# Patient Record
Sex: FEMALE | Race: BLACK OR AFRICAN AMERICAN | NOT HISPANIC OR LATINO | Employment: OTHER | URBAN - METROPOLITAN AREA
[De-identification: names, ages, dates, MRNs, and addresses within clinical notes are randomized per-mention and may not be internally consistent; named-entity substitution may affect disease eponyms.]

---

## 2020-08-07 ENCOUNTER — HOSPITAL ENCOUNTER (OUTPATIENT)
Facility: HOSPITAL | Age: 70
Discharge: HOME | End: 2020-08-08
Attending: EMERGENCY MEDICINE | Admitting: INTERNAL MEDICINE
Payer: MEDICARE

## 2020-08-07 DIAGNOSIS — Z45.018 ENCOUNTER FOR INTERROGATION OF CARDIAC PACEMAKER: ICD-10-CM

## 2020-08-07 DIAGNOSIS — R07.9 CHEST PAIN: ICD-10-CM

## 2020-08-07 DIAGNOSIS — R00.2 PALPITATIONS: Primary | ICD-10-CM

## 2020-08-07 DIAGNOSIS — R00.2 PALPITATIONS: ICD-10-CM

## 2020-08-07 DIAGNOSIS — E87.6 HYPOKALEMIA: ICD-10-CM

## 2020-08-07 LAB
ALBUMIN SERPL BCP-MCNC: 4.3 G/DL (ref 3.5–5.2)
ALP SERPL-CCNC: 67 U/L (ref 55–135)
ALT SERPL W/O P-5'-P-CCNC: 27 U/L (ref 10–44)
ANION GAP SERPL CALC-SCNC: 10 MMOL/L (ref 8–16)
AST SERPL-CCNC: 30 U/L (ref 10–40)
BASOPHILS # BLD AUTO: 0.03 K/UL (ref 0–0.2)
BASOPHILS NFR BLD: 0.7 % (ref 0–1.9)
BILIRUB SERPL-MCNC: 0.5 MG/DL (ref 0.1–1)
BNP SERPL-MCNC: 27 PG/ML (ref 0–99)
BUN SERPL-MCNC: 17 MG/DL (ref 8–23)
CALCIUM SERPL-MCNC: 9.3 MG/DL (ref 8.7–10.5)
CHLORIDE SERPL-SCNC: 106 MMOL/L (ref 95–110)
CO2 SERPL-SCNC: 23 MMOL/L (ref 23–29)
CREAT SERPL-MCNC: 1 MG/DL (ref 0.5–1.4)
D DIMER PPP IA.FEU-MCNC: 0.68 MG/L FEU
DIFFERENTIAL METHOD: ABNORMAL
EOSINOPHIL # BLD AUTO: 0.1 K/UL (ref 0–0.5)
EOSINOPHIL NFR BLD: 1.2 % (ref 0–8)
ERYTHROCYTE [DISTWIDTH] IN BLOOD BY AUTOMATED COUNT: 14 % (ref 11.5–14.5)
EST. GFR  (AFRICAN AMERICAN): >60 ML/MIN/1.73 M^2
EST. GFR  (NON AFRICAN AMERICAN): 58 ML/MIN/1.73 M^2
GLUCOSE SERPL-MCNC: 165 MG/DL (ref 70–110)
HCT VFR BLD AUTO: 34.7 % (ref 37–48.5)
HGB BLD-MCNC: 11.3 G/DL (ref 12–16)
IMM GRANULOCYTES # BLD AUTO: 0.01 K/UL (ref 0–0.04)
IMM GRANULOCYTES NFR BLD AUTO: 0.2 % (ref 0–0.5)
LYMPHOCYTES # BLD AUTO: 1.7 K/UL (ref 1–4.8)
LYMPHOCYTES NFR BLD: 41.9 % (ref 18–48)
MAGNESIUM SERPL-MCNC: 1.9 MG/DL (ref 1.6–2.6)
MCH RBC QN AUTO: 28.3 PG (ref 27–31)
MCHC RBC AUTO-ENTMCNC: 32.6 G/DL (ref 32–36)
MCV RBC AUTO: 87 FL (ref 82–98)
MONOCYTES # BLD AUTO: 0.3 K/UL (ref 0.3–1)
MONOCYTES NFR BLD: 7.2 % (ref 4–15)
NEUTROPHILS # BLD AUTO: 2 K/UL (ref 1.8–7.7)
NEUTROPHILS NFR BLD: 48.8 % (ref 38–73)
NRBC BLD-RTO: 0 /100 WBC
PLATELET # BLD AUTO: 235 K/UL (ref 150–350)
PMV BLD AUTO: 9.8 FL (ref 9.2–12.9)
POTASSIUM SERPL-SCNC: 3.2 MMOL/L (ref 3.5–5.1)
PROT SERPL-MCNC: 7.4 G/DL (ref 6–8.4)
RBC # BLD AUTO: 4 M/UL (ref 4–5.4)
SARS-COV-2 RDRP RESP QL NAA+PROBE: NEGATIVE
SODIUM SERPL-SCNC: 139 MMOL/L (ref 136–145)
TROPONIN I SERPL DL<=0.01 NG/ML-MCNC: 0.01 NG/ML (ref 0.02–0.5)
TROPONIN I SERPL DL<=0.01 NG/ML-MCNC: <0.01 NG/ML (ref 0.02–0.5)
WBC # BLD AUTO: 4.03 K/UL (ref 3.9–12.7)

## 2020-08-07 PROCEDURE — 83735 ASSAY OF MAGNESIUM: CPT

## 2020-08-07 PROCEDURE — 25000003 PHARM REV CODE 250: Performed by: EMERGENCY MEDICINE

## 2020-08-07 PROCEDURE — 84484 ASSAY OF TROPONIN QUANT: CPT

## 2020-08-07 PROCEDURE — 96360 HYDRATION IV INFUSION INIT: CPT | Performed by: INTERNAL MEDICINE

## 2020-08-07 PROCEDURE — 96361 HYDRATE IV INFUSION ADD-ON: CPT | Performed by: INTERNAL MEDICINE

## 2020-08-07 PROCEDURE — 85025 COMPLETE CBC W/AUTO DIFF WBC: CPT

## 2020-08-07 PROCEDURE — 80053 COMPREHEN METABOLIC PANEL: CPT

## 2020-08-07 PROCEDURE — 83880 ASSAY OF NATRIURETIC PEPTIDE: CPT

## 2020-08-07 PROCEDURE — G0378 HOSPITAL OBSERVATION PER HR: HCPCS

## 2020-08-07 PROCEDURE — 71045 X-RAY EXAM CHEST 1 VIEW: CPT | Mod: TC,FY

## 2020-08-07 PROCEDURE — 93005 ELECTROCARDIOGRAM TRACING: CPT

## 2020-08-07 PROCEDURE — 36415 COLL VENOUS BLD VENIPUNCTURE: CPT

## 2020-08-07 PROCEDURE — 71045 XR CHEST AP PORTABLE: ICD-10-PCS | Mod: 26,,, | Performed by: RADIOLOGY

## 2020-08-07 PROCEDURE — 84484 ASSAY OF TROPONIN QUANT: CPT | Mod: 91

## 2020-08-07 PROCEDURE — 99285 EMERGENCY DEPT VISIT HI MDM: CPT | Mod: 25

## 2020-08-07 PROCEDURE — 71045 X-RAY EXAM CHEST 1 VIEW: CPT | Mod: 26,,, | Performed by: RADIOLOGY

## 2020-08-07 PROCEDURE — 99220 PR INITIAL OBSERVATION CARE,LEVL III: ICD-10-PCS | Mod: ,,, | Performed by: INTERNAL MEDICINE

## 2020-08-07 PROCEDURE — 25000003 PHARM REV CODE 250: Performed by: INTERNAL MEDICINE

## 2020-08-07 PROCEDURE — 99220 PR INITIAL OBSERVATION CARE,LEVL III: CPT | Mod: ,,, | Performed by: INTERNAL MEDICINE

## 2020-08-07 PROCEDURE — U0002 COVID-19 LAB TEST NON-CDC: HCPCS

## 2020-08-07 PROCEDURE — 94760 N-INVAS EAR/PLS OXIMETRY 1: CPT

## 2020-08-07 PROCEDURE — 85379 FIBRIN DEGRADATION QUANT: CPT

## 2020-08-07 RX ORDER — PSEUDOEPHEDRINE HCL 30 MG
30 TABLET ORAL EVERY 4 HOURS PRN
COMMUNITY

## 2020-08-07 RX ORDER — SODIUM CHLORIDE 9 MG/ML
INJECTION, SOLUTION INTRAVENOUS CONTINUOUS
Status: DISCONTINUED | OUTPATIENT
Start: 2020-08-07 | End: 2020-08-08 | Stop reason: HOSPADM

## 2020-08-07 RX ORDER — ASPIRIN 325 MG
325 TABLET ORAL
Status: COMPLETED | OUTPATIENT
Start: 2020-08-07 | End: 2020-08-07

## 2020-08-07 RX ORDER — SODIUM CHLORIDE 0.9 % (FLUSH) 0.9 %
10 SYRINGE (ML) INJECTION
Status: DISCONTINUED | OUTPATIENT
Start: 2020-08-07 | End: 2020-08-08 | Stop reason: HOSPADM

## 2020-08-07 RX ORDER — ATORVASTATIN CALCIUM 10 MG/1
10 TABLET, FILM COATED ORAL DAILY
COMMUNITY

## 2020-08-07 RX ORDER — NAPROXEN SODIUM 220 MG/1
81 TABLET, FILM COATED ORAL DAILY
COMMUNITY

## 2020-08-07 RX ORDER — ACETAMINOPHEN 325 MG/1
650 TABLET ORAL EVERY 4 HOURS PRN
Status: DISCONTINUED | OUTPATIENT
Start: 2020-08-07 | End: 2020-08-08 | Stop reason: HOSPADM

## 2020-08-07 RX ORDER — AMLODIPINE BESYLATE 2.5 MG/1
10 TABLET ORAL DAILY
Status: DISCONTINUED | OUTPATIENT
Start: 2020-08-07 | End: 2020-08-08 | Stop reason: HOSPADM

## 2020-08-07 RX ORDER — MORPHINE SULFATE 10 MG/ML
2 INJECTION INTRAMUSCULAR; INTRAVENOUS; SUBCUTANEOUS EVERY 4 HOURS PRN
Status: DISCONTINUED | OUTPATIENT
Start: 2020-08-07 | End: 2020-08-08 | Stop reason: HOSPADM

## 2020-08-07 RX ORDER — NAPROXEN SODIUM 220 MG/1
81 TABLET, FILM COATED ORAL DAILY
Status: DISCONTINUED | OUTPATIENT
Start: 2020-08-07 | End: 2020-08-08 | Stop reason: HOSPADM

## 2020-08-07 RX ORDER — METOPROLOL TARTRATE 25 MG/1
100 TABLET, FILM COATED ORAL 2 TIMES DAILY
Status: DISCONTINUED | OUTPATIENT
Start: 2020-08-07 | End: 2020-08-08 | Stop reason: HOSPADM

## 2020-08-07 RX ORDER — LISINOPRIL 10 MG/1
20 TABLET ORAL DAILY
Status: DISCONTINUED | OUTPATIENT
Start: 2020-08-07 | End: 2020-08-08 | Stop reason: HOSPADM

## 2020-08-07 RX ORDER — AMLODIPINE BESYLATE 10 MG/1
10 TABLET ORAL DAILY
COMMUNITY

## 2020-08-07 RX ORDER — METOPROLOL TARTRATE 100 MG/1
100 TABLET ORAL 2 TIMES DAILY
COMMUNITY

## 2020-08-07 RX ORDER — SODIUM CHLORIDE 9 MG/ML
INJECTION, SOLUTION INTRAVENOUS CONTINUOUS
Status: DISCONTINUED | OUTPATIENT
Start: 2020-08-07 | End: 2020-08-07

## 2020-08-07 RX ORDER — LISINOPRIL 20 MG/1
20 TABLET ORAL DAILY
COMMUNITY

## 2020-08-07 RX ADMIN — SODIUM CHLORIDE: 0.9 INJECTION, SOLUTION INTRAVENOUS at 11:08

## 2020-08-07 RX ADMIN — ASPIRIN 325 MG ORAL TABLET 325 MG: 325 PILL ORAL at 09:08

## 2020-08-07 RX ADMIN — METOPROLOL TARTRATE 100 MG: 25 TABLET, FILM COATED ORAL at 09:08

## 2020-08-07 RX ADMIN — SODIUM CHLORIDE: 0.9 INJECTION, SOLUTION INTRAVENOUS at 03:08

## 2020-08-07 NOTE — SUBJECTIVE & OBJECTIVE
Past Medical History:   Diagnosis Date    Hyperlipemia     Hypertension        Past Surgical History:   Procedure Laterality Date     SECTION      INSERTION OF PACEMAKER         Review of patient's allergies indicates:  No Known Allergies    No current facility-administered medications on file prior to encounter.      Current Outpatient Medications on File Prior to Encounter   Medication Sig    amLODIPine (NORVASC) 10 MG tablet Take 10 mg by mouth once daily.    aspirin 81 MG Chew Take 81 mg by mouth once daily.    atorvastatin (LIPITOR) 10 MG tablet Take 10 mg by mouth once daily.    lisinopriL (PRINIVIL,ZESTRIL) 20 MG tablet Take 20 mg by mouth once daily.    metoprolol tartrate (LOPRESSOR) 100 MG tablet Take 100 mg by mouth 2 (two) times daily.    pseudoephedrine (SUDAFED) 30 MG tablet Take 30 mg by mouth every 4 (four) hours as needed for Congestion.     Family History     None        Tobacco Use    Smoking status: Never Smoker   Substance and Sexual Activity    Alcohol use: Not Currently    Drug use: Not on file    Sexual activity: Not on file     Review of Systems   Constitutional: Negative for activity change, appetite change, fatigue and fever.   HENT: Negative for congestion, ear discharge, mouth sores, nosebleeds, rhinorrhea, sinus pressure, sinus pain and tinnitus.    Eyes: Negative.  Negative for pain, redness and itching.   Respiratory: Negative for apnea, cough, choking, chest tightness, shortness of breath, wheezing and stridor.    Cardiovascular: Negative for chest pain, palpitations and leg swelling.   Gastrointestinal: Negative for abdominal distention, abdominal pain, anal bleeding, blood in stool, constipation and diarrhea.   Endocrine: Negative.    Genitourinary: Negative for difficulty urinating, flank pain, frequency and urgency.   Musculoskeletal: Negative for arthralgias, back pain, gait problem and myalgias.   Skin: Negative for color change and pallor.    Allergic/Immunologic: Negative.    Neurological: Negative for dizziness, facial asymmetry, weakness, light-headedness and headaches.   Hematological: Negative for adenopathy. Does not bruise/bleed easily.   Psychiatric/Behavioral: The patient is nervous/anxious.      Objective:     Vital Signs (Most Recent):  Temp: 97.2 °F (36.2 °C) (08/07/20 1125)  Pulse: 70 (08/07/20 1125)  Resp: 18 (08/07/20 1125)  BP: 127/64 (08/07/20 1125)  SpO2: 98 % (08/07/20 1125) Vital Signs (24h Range):  Temp:  [97.2 °F (36.2 °C)-98 °F (36.7 °C)] 97.2 °F (36.2 °C)  Pulse:  [] 70  Resp:  [14-20] 18  SpO2:  [96 %-98 %] 98 %  BP: (115-138)/(64-72) 127/64     Weight: 74.3 kg (163 lb 12.8 oz)  Body mass index is 27.26 kg/m².    Physical Exam  Vitals signs and nursing note reviewed.   Constitutional:       Appearance: She is well-developed.   HENT:      Head: Normocephalic and atraumatic.   Eyes:      Pupils: Pupils are equal, round, and reactive to light.   Neck:      Musculoskeletal: Normal range of motion and neck supple.      Thyroid: No thyromegaly.      Trachea: No tracheal deviation.   Cardiovascular:      Rate and Rhythm: Normal rate and regular rhythm.      Heart sounds: Normal heart sounds.   Pulmonary:      Effort: Pulmonary effort is normal.      Breath sounds: Normal breath sounds.   Abdominal:      General: Bowel sounds are normal. There is no distension.      Palpations: Abdomen is soft.      Tenderness: There is no abdominal tenderness. There is no guarding or rebound.   Musculoskeletal: Normal range of motion.   Lymphadenopathy:      Cervical: No cervical adenopathy.   Skin:     General: Skin is warm and dry.      Capillary Refill: Capillary refill takes less than 2 seconds.   Neurological:      Mental Status: She is alert and oriented to person, place, and time.   Psychiatric:         Behavior: Behavior normal.         Thought Content: Thought content normal.         Judgment: Judgment normal.           CRANIAL NERVES      CN III, IV, VI   Pupils are equal, round, and reactive to light.       Significant Labs:   Recent Lab Results       08/07/20  0921   08/07/20  0904        Albumin   4.3     Alkaline Phosphatase   67     ALT   27     Anion Gap   10     AST   30     Baso #   0.03     Basophil%   0.7     BILIRUBIN TOTAL   0.5  Comment:  For infants and newborns, interpretation of results should be based  on gestational age, weight and in agreement with clinical  observations.  Premature Infant recommended reference ranges:  Up to 24 hours.............<8.0 mg/dL  Up to 48 hours............<12.0 mg/dL  3-5 days..................<15.0 mg/dL  6-29 days.................<15.0 mg/dL       BNP   27  Comment:  Values of less than 100 pg/ml are consistent with non-CHF populations.     BUN, Bld   17     Calcium   9.3     Chloride   106     CO2   23     Creatinine   1.0     D-Dimer   0.68  Comment:  The quantitative D-dimer assay should be used as an aid in   the diagnosis of deep vein thrombosis and pulmonary embolism  in patients with the appropriate presentation and clinical  history. The upper limit of the reference interval and the clinical   cut off   point are identical. Causes of a positive (>0.50 mg/L FEU) D-Dimer   test  include, but are not limited to: DVT, PE, DIC, thrombolytic   therapy, anticoagulant therapy, recent surgery, trauma, or   pregnancy, disseminated malignancy, aortic aneurysm, cirrhosis,  and severe infection. False negative results may occur in   patients with distal DVT.       Differential Method   Automated     eGFR if    >60.0     eGFR if non    58.0  Comment:  Calculation used to obtain the estimated glomerular filtration  rate (eGFR) is the CKD-EPI equation.        Eos #   0.1     Eosinophil%   1.2     Glucose   165     Gran # (ANC)   2.0     Gran%   48.8     Hematocrit   34.7     Hemoglobin   11.3     Immature Grans (Abs)   0.01  Comment:  Mild elevation in immature granulocytes  is non specific and   can be seen in a variety of conditions including stress response,   acute inflammation, trauma and pregnancy. Correlation with other   laboratory and clinical findings is essential.       Immature Granulocytes   0.2     Lymph #   1.7     Lymph%   41.9     Magnesium   1.9     MCH   28.3     MCHC   32.6     MCV   87     Mono #   0.3     Mono%   7.2     MPV   9.8     nRBC   0     Platelets   235     Potassium   3.2     PROTEIN TOTAL   7.4     RBC   4.00     RDW   14.0     SARS-CoV-2 RNA, Amplification, Qual Negative  Comment:  This test utilizes isothermal nucleic acid amplification   technology to detect the SARS-CoV-2 RdRp nucleic acid segment.   The analytical sensitivity (limit of detection) is 125 genome   equivalents/mL.   A POSITIVE result implies infection with the SARS-CoV-2 virus;  the patient is presumed to be contagious.    A NEGATIVE result means that SARS-CoV-2 nucleic acids are not  present above the limit of detection. A NEGATIVE result should be   treated as presumptive. It does not rule out the possibility of   COVID-19 and should not be the sole basis for treatment decisions.   If COVID-19 is strongly suspected based on clinical and exposure   history, re-testing using an alternate molecular assay should be   considered.   This test is only for use under the Food and Drug   Administration s Emergency Use Authorization (EUA).   Commercial kits are provided by [employer].   Performance characteristics of the EUA have been independently  verified by [facility] Department of  Pathology and Laboratory Medicine.   _________________________________________________________________  The ID NOW COVID-19 Letter of Authorization, along with the   authorized Fact Sheet for Healthcare Providers, the authorized Fact  Sheet for Patients, and authorized labeling are available on the FDA   website:  www.fda.gov/MedicalDevices/Safety/EmergencySituations/rnu496293.htm          Sodium   139     Troponin I   <0.01     WBC   4.03         All pertinent labs within the past 24 hours have been reviewed.    Significant Imaging: I have reviewed and interpreted all pertinent imaging results/findings within the past 24 hours.

## 2020-08-07 NOTE — HPI
Patient is a 69-year-old female that presented to the emergency department this morning complaining of feeling that her heart was speeding up in slowing down.  Patient also complains of palpitations with mild shortness of breath.  Patient explained this by saying when these occurred she felt like she had to take a deep breath.  She denied this was associated with any chest pain, diaphoresis, nausea, vomiting, but was associated with feeling of mild shortness of breath and palpitations.  Patient has a past medical history of pacemaker placement due to a third-degree heart block.  Patient does seem be functioning correctly by a CD however patient a representative will be out today to interrogate her pacer.  At the time of my visit after admission the patient stated that the feeling in her chest had disappeared and he was eating and tolerating her lunch.  Otherwise this patient has a history of hypertension.

## 2020-08-07 NOTE — ED PROVIDER NOTES
Encounter Date: 2020       History     Chief Complaint   Patient presents with    Palpitations     this am     63-year-old female past medical history significant for hyperlipidemia, hypertension, pacemaker in situ presents to the ED for emergent evaluation chest pain with associated palpitations that began approximately 30 min prior to arrival while lying in bed watching television.  Denies any previous illness, fever, chills, cough.  Follows with a cardiologist in [place], [place] and had a normal checkup approximately 1 month ago.       Arrives to the ED tachycardic with acute chest pain.        Review of patient's allergies indicates:  No Known Allergies  Past Medical History:   Diagnosis Date    Hyperlipemia     Hypertension      Past Surgical History:   Procedure Laterality Date     SECTION      INSERTION OF PACEMAKER       History reviewed. No pertinent family history.  Social History     Tobacco Use    Smoking status: Never Smoker   Substance Use Topics    Alcohol use: Not Currently    Drug use: Not on file     Review of Systems   Constitutional: Negative for appetite change, chills, diaphoresis, fatigue and fever.   HENT: Negative for congestion, ear pain, rhinorrhea, sinus pressure, sinus pain, sore throat and tinnitus.    Eyes: Negative for photophobia and visual disturbance.   Respiratory: Positive for chest tightness and shortness of breath. Negative for cough and wheezing.    Cardiovascular: Positive for chest pain. Negative for palpitations and leg swelling.   Gastrointestinal: Negative for abdominal pain, constipation, diarrhea, nausea and vomiting.   Endocrine: Negative for cold intolerance, heat intolerance, polydipsia, polyphagia and polyuria.   Genitourinary: Negative for decreased urine volume, difficulty urinating, dysuria, flank pain, frequency, hematuria and urgency.   Musculoskeletal: Negative for arthralgias, back pain, gait problem, joint swelling, myalgias, neck  pain and neck stiffness.   Skin: Negative for color change, pallor, rash and wound.   Allergic/Immunologic: Negative for immunocompromised state.   Neurological: Negative for dizziness, syncope, weakness, light-headedness, numbness and headaches.   Hematological: Negative for adenopathy. Does not bruise/bleed easily.   Psychiatric/Behavioral: Negative for decreased concentration, dysphoric mood and sleep disturbance. The patient is not nervous/anxious.    All other systems reviewed and are negative.      Physical Exam     Initial Vitals [08/07/20 0837]   BP Pulse Resp Temp SpO2   115/71 (!) 120 20 98 °F (36.7 °C) 97 %      MAP       --         Physical Exam    Nursing note and vitals reviewed.  Constitutional: She appears well-developed and well-nourished. She is not diaphoretic. No distress.   HENT:   Head: Normocephalic and atraumatic.   Right Ear: External ear normal.   Left Ear: External ear normal.   Nose: Nose normal.   Mouth/Throat: Oropharynx is clear and moist.   Eyes: Conjunctivae are normal. Pupils are equal, round, and reactive to light. No scleral icterus.   Neck: Normal range of motion. Neck supple. No JVD present.   Cardiovascular: Regular rhythm, normal heart sounds and intact distal pulses. Tachycardia present.    Pulmonary/Chest: Breath sounds normal. No respiratory distress. She has no wheezes. She has no rhonchi. She has no rales. She exhibits no tenderness.   Abdominal: Soft. Bowel sounds are normal. She exhibits no distension. There is no abdominal tenderness. There is no rebound and no guarding.   Musculoskeletal: Normal range of motion. No tenderness or edema.   Lymphadenopathy:     She has no cervical adenopathy.   Neurological: She is alert and oriented to person, place, and time. GCS score is 15. GCS eye subscore is 4. GCS verbal subscore is 5. GCS motor subscore is 6.   Skin: Skin is warm and dry. Capillary refill takes less than 2 seconds. No rash noted. No erythema. No pallor.    Psychiatric: She has a normal mood and affect. Her behavior is normal. Judgment and thought content normal.         ED Course   Procedures  Labs Reviewed   CBC W/ AUTO DIFFERENTIAL - Abnormal; Notable for the following components:       Result Value    Hemoglobin 11.3 (*)     Hematocrit 34.7 (*)     All other components within normal limits   COMPREHENSIVE METABOLIC PANEL - Abnormal; Notable for the following components:    Potassium 3.2 (*)     Glucose 165 (*)     eGFR if non  58.0 (*)     All other components within normal limits   TROPONIN I - Abnormal; Notable for the following components:    Troponin I <0.01 (*)     All other components within normal limits   D DIMER, QUANTITATIVE - Abnormal; Notable for the following components:    D-Dimer 0.68 (*)     All other components within normal limits   B-TYPE NATRIURETIC PEPTIDE   MAGNESIUM   SARS-COV-2 RNA AMPLIFICATION, QUAL     EKG Readings: (Independently Interpreted)   Initial Reading: No STEMI. Rhythm: Paced Rhythm. Heart Rate: 88. Ectopy: No Ectopy. Conduction: Normal. ST Segments: Normal ST Segments. T Waves: Normal. Axis: Normal. Clinical Impression: Paced Rhythm       Imaging Results          X-Ray Chest AP Portable (Final result)  Result time 08/07/20 09:26:25    Final result by Mary Walker MD (08/07/20 09:26:25)                 Impression:      Borderline heart size.  Findings suggestive of minimal interstitial edema.      Electronically signed by: Mary Walker  Date:    08/07/2020  Time:    09:26             Narrative:    EXAMINATION:  XR CHEST AP PORTABLE    CLINICAL HISTORY:  Chest Pain;    TECHNIQUE:  Single frontal view of the chest was performed.    COMPARISON:  None    FINDINGS:  The heart size is at the upper limits of normal with a dual lead pacemaker in place.  Indistinctness of the pulmonary vasculature raises the possibility of a minimal interstitial edema.  A trace left pleural effusion is not ruled out.   There is widening of the right AC joint which may be due to an old injury.  Calcific plaque is noted in the aorta.                              X-Rays:   Independently Interpreted Readings:   Chest X-Ray: No acute disease seen, no consolidation, atelectasis or PTX.     Medical Decision Making:   Differential Diagnosis:   Acute chest wall pain, acute costochondritis, acute myocardial infarction, stable angina pectoris, unstable angina pectoris, acute aortic dissection, atypical chest pain, acute cholecystitis, acute chest pain, acute cholelithiasis, acute musculoskeletal pain, acute pericarditis, acute myocarditis, acute coronary vasospasm, acute dissecting aorta, acute/episodic esophageal spasm, GERD, acute pleurisy, pneumonia, spontaneous pneumothorax, tension pneumothorax, hiatal hernia, acute myocardial ischemia, acute pancreatitis, acute peptic ulcer, acute ST elevation MI, acute congestive heart failure  ED Management:  This is an emergent evaluation for a patient with chest pain. The patient is complaining of chest pain for the past day. The patient's pain is atypical for cardiac etiology.  I decided to obtain and review the patient's past medical record.      The vital signs are stable in the room but do reflect tachycardia.  EKG is normal.  There is no evidence of STEMI or ischemia.    CXR is negative for pneumonia, pneumothorax and edema.  Troponin is negative and but due to abrupt onset and history of pacemaker will require observation for ACS rule out.  [employer] representative has been contacted for interrogation - pending arrival.  BNP is negative.  There is no evidence of congestive heart failure.  The electrolytes are relatively normal.  The pt is not anemic.  D-DIMER is slightly elevated.  This has been relayed to the admitting hospitalist who will follow with a CT PE study.  The patient's symptoms were treated with:  Aspirin    Currently the patient has a non-diagnostic EKG with negative troponin in  the emergency department.  I doubt acute coronary syndrome.  I did inform the patient that even with negative testing, we can never eliminate all risk.  I believe the patient is low risk with negative initial testing; they are appropriate for observation with serial cardiac enzymes and pacemaker interrogation, possible stress test.  The patient is aware of the small but persistent risk for MI/ACS with subsequent cardiac complications or death.  I have low suspicion for cardiopulmonary, vascular, infectious, respiratory, or other emergent medical condition based on my evaluation in the ED.     The patient's pain is currently improved.      The results and physical exam findings were reviewed with the patient. Patient agrees with assessment, disposition and treatment plan and has no further questions or complaints at this time.  She will be admitted to Dr. Ribera at this time    Additional MDM:   PERC Rule:   Age is greater than or equal to 50 = 1.0  Heart Rate is greater than or equal to 100 = 1.0  SaO2 on room air < 95% = 0.0  Unilateral leg swelling = 0.0  Hemoptysis = 0.0  Recent surgery or trauma = 0.0  Prior PE or DVT =  0.0  Hormone use = 0.00  PERC Score = 2    Well's Criteria Score:  -Clinical symptoms of DVT (leg swelling, pain with palpation) = 0.0  -Other diagnosis less likely than pulmonary embolism =            3.0  -Heart Rate >100 =   1.5  -Immobilization (= or > than 3 days) or surgery in the previous 4 weeks = 0.0  -Previous DVT/PE = 0.0  -Hemoptysis =          0.0  -Malignancy =           0.0  Well's Probability Score =    4.5      Heart Score:    History:          Slightly suspicious.  ECG:             Normal  Age:               >65 years  Risk factors: 1-2 risk factors  Troponin:       Less than or equal to normal limit  Final Score: 3      CLAUDIA Score:   Age over 65:                                    1.00   > or = to 3 CAD risk factors:           0.00  Established CAD:                             0.00  > or = to 2 anginal events in the past 24 hours: 0.00  Use of ASA in past 7 days:              1.00  Elevated Enzymes:                         0.00  ST Depression > or = to 0.05 mV:  0.00  CLAUDIA score: 2    Heart Failure Score:   Systolic BP = 120-129  Heart Rate = >105  Sodium = >139  COPD = No  Black = No  BUN = 10-19  Age = 60-69  Patient has a GWTG HF Risk Score for In-Hospital Mortality of 42 which translates into a probability of death of 1-5% (Low Risk).                              Clinical Impression:       ICD-10-CM ICD-9-CM   1. Palpitations  R00.2 785.1   2. Chest pain  R07.9 786.50   3. Hypokalemia  E87.6 276.8   4. Encounter for interrogation of cardiac pacemaker  Z45.018 V53.31   5. Palpitations  R00.2 785.1         Disposition:   Disposition: Placed in Observation  Condition: Stable                        Aishwarya Farrell MD  08/07/20 0372

## 2020-08-07 NOTE — PLAN OF CARE
08/07/20 1615   GUTIERREZ Message   Medicare Outpatient and Observation Notification regarding financial responsibility Given to patient/caregiver;Explained to patient/caregiver;Signed/date by patient/caregiver   Date GUTIERREZ was signed 08/07/20   Time GUTIERREZ was signed 1601

## 2020-08-07 NOTE — ED NOTES
AMALIA Mckeon from med/surg called back, informed her about [employer] Representative being aware patient was admitted and that he will be here this afternoon to interrogate pacemaker.      Emely Ortega RN  08/07/20 5930

## 2020-08-07 NOTE — H&P
[facility] Medicine  History & Physical    Patient Name: Alayna Bergman  MRN: [id number]  Admission Date: 2020  Attending Physician: Juan Luis Ribera MD  Primary Care Provider: No primary care provider on file.         Patient information was obtained from patient and ER records.     Subjective:     Principal Problem:Palpitations    Chief Complaint:   Chief Complaint   Patient presents with    Palpitations     this am        HPI: Patient is a 69-year-old female that presented to the emergency department this morning complaining of feeling that her heart was speeding up in slowing down.  Patient also complains of palpitations with mild shortness of breath.  Patient explained this by saying when these occurred she felt like she had to take a deep breath.  She denied this was associated with any chest pain, diaphoresis, nausea, vomiting, but was associated with feeling of mild shortness of breath and palpitations.  Patient has a past medical history of pacemaker placement due to a third-degree heart block.  Patient does seem be functioning correctly by a CD however patient a representative will be out today to interrogate her pacer.  At the time of my visit after admission the patient stated that the feeling in her chest had disappeared and he was eating and tolerating her lunch.  Otherwise this patient has a history of hypertension.    Past Medical History:   Diagnosis Date    Hyperlipemia     Hypertension        Past Surgical History:   Procedure Laterality Date     SECTION      INSERTION OF PACEMAKER         Review of patient's allergies indicates:  No Known Allergies    No current facility-administered medications on file prior to encounter.      Current Outpatient Medications on File Prior to Encounter   Medication Sig    amLODIPine (NORVASC) 10 MG tablet Take 10 mg by mouth once daily.    aspirin 81 MG Chew Take 81 mg by mouth once daily.    atorvastatin  (LIPITOR) 10 MG tablet Take 10 mg by mouth once daily.    lisinopriL (PRINIVIL,ZESTRIL) 20 MG tablet Take 20 mg by mouth once daily.    metoprolol tartrate (LOPRESSOR) 100 MG tablet Take 100 mg by mouth 2 (two) times daily.    pseudoephedrine (SUDAFED) 30 MG tablet Take 30 mg by mouth every 4 (four) hours as needed for Congestion.     Family History     None        Tobacco Use    Smoking status: Never Smoker   Substance and Sexual Activity    Alcohol use: Not Currently    Drug use: Not on file    Sexual activity: Not on file     Review of Systems   Constitutional: Negative for activity change, appetite change, fatigue and fever.   HENT: Negative for congestion, ear discharge, mouth sores, nosebleeds, rhinorrhea, sinus pressure, sinus pain and tinnitus.    Eyes: Negative.  Negative for pain, redness and itching.   Respiratory: Negative for apnea, cough, choking, chest tightness, shortness of breath, wheezing and stridor.    Cardiovascular: Negative for chest pain, palpitations and leg swelling.   Gastrointestinal: Negative for abdominal distention, abdominal pain, anal bleeding, blood in stool, constipation and diarrhea.   Endocrine: Negative.    Genitourinary: Negative for difficulty urinating, flank pain, frequency and urgency.   Musculoskeletal: Negative for arthralgias, back pain, gait problem and myalgias.   Skin: Negative for color change and pallor.   Allergic/Immunologic: Negative.    Neurological: Negative for dizziness, facial asymmetry, weakness, light-headedness and headaches.   Hematological: Negative for adenopathy. Does not bruise/bleed easily.   Psychiatric/Behavioral: The patient is nervous/anxious.      Objective:     Vital Signs (Most Recent):  Temp: 97.2 °F (36.2 °C) (08/07/20 1125)  Pulse: 70 (08/07/20 1125)  Resp: 18 (08/07/20 1125)  BP: 127/64 (08/07/20 1125)  SpO2: 98 % (08/07/20 1125) Vital Signs (24h Range):  Temp:  [97.2 °F (36.2 °C)-98 °F (36.7 °C)] 97.2 °F (36.2 °C)  Pulse:   [] 70  Resp:  [14-20] 18  SpO2:  [96 %-98 %] 98 %  BP: (115-138)/(64-72) 127/64     Weight: 74.3 kg (163 lb 12.8 oz)  Body mass index is 27.26 kg/m².    Physical Exam  Vitals signs and nursing note reviewed.   Constitutional:       Appearance: She is well-developed.   HENT:      Head: Normocephalic and atraumatic.   Eyes:      Pupils: Pupils are equal, round, and reactive to light.   Neck:      Musculoskeletal: Normal range of motion and neck supple.      Thyroid: No thyromegaly.      Trachea: No tracheal deviation.   Cardiovascular:      Rate and Rhythm: Normal rate and regular rhythm.      Heart sounds: Normal heart sounds.   Pulmonary:      Effort: Pulmonary effort is normal.      Breath sounds: Normal breath sounds.   Abdominal:      General: Bowel sounds are normal. There is no distension.      Palpations: Abdomen is soft.      Tenderness: There is no abdominal tenderness. There is no guarding or rebound.   Musculoskeletal: Normal range of motion.   Lymphadenopathy:      Cervical: No cervical adenopathy.   Skin:     General: Skin is warm and dry.      Capillary Refill: Capillary refill takes less than 2 seconds.   Neurological:      Mental Status: She is alert and oriented to person, place, and time.   Psychiatric:         Behavior: Behavior normal.         Thought Content: Thought content normal.         Judgment: Judgment normal.           CRANIAL NERVES     CN III, IV, VI   Pupils are equal, round, and reactive to light.       Significant Labs:   Recent Lab Results       08/07/20  0921   08/07/20  0904        Albumin   4.3     Alkaline Phosphatase   67     ALT   27     Anion Gap   10     AST   30     Baso #   0.03     Basophil%   0.7     BILIRUBIN TOTAL   0.5  Comment:  For infants and newborns, interpretation of results should be based  on gestational age, weight and in agreement with clinical  observations.  Premature Infant recommended reference ranges:  Up to 24 hours.............<8.0 mg/dL  Up to  48 hours............<12.0 mg/dL  3-5 days..................<15.0 mg/dL  6-29 days.................<15.0 mg/dL       BNP   27  Comment:  Values of less than 100 pg/ml are consistent with non-CHF populations.     BUN, Bld   17     Calcium   9.3     Chloride   106     CO2   23     Creatinine   1.0     D-Dimer   0.68  Comment:  The quantitative D-dimer assay should be used as an aid in   the diagnosis of deep vein thrombosis and pulmonary embolism  in patients with the appropriate presentation and clinical  history. The upper limit of the reference interval and the clinical   cut off   point are identical. Causes of a positive (>0.50 mg/L FEU) D-Dimer   test  include, but are not limited to: DVT, PE, DIC, thrombolytic   therapy, anticoagulant therapy, recent surgery, trauma, or   pregnancy, disseminated malignancy, aortic aneurysm, cirrhosis,  and severe infection. False negative results may occur in   patients with distal DVT.       Differential Method   Automated     eGFR if    >60.0     eGFR if non    58.0  Comment:  Calculation used to obtain the estimated glomerular filtration  rate (eGFR) is the CKD-EPI equation.        Eos #   0.1     Eosinophil%   1.2     Glucose   165     Gran # (ANC)   2.0     Gran%   48.8     Hematocrit   34.7     Hemoglobin   11.3     Immature Grans (Abs)   0.01  Comment:  Mild elevation in immature granulocytes is non specific and   can be seen in a variety of conditions including stress response,   acute inflammation, trauma and pregnancy. Correlation with other   laboratory and clinical findings is essential.       Immature Granulocytes   0.2     Lymph #   1.7     Lymph%   41.9     Magnesium   1.9     MCH   28.3     MCHC   32.6     MCV   87     Mono #   0.3     Mono%   7.2     MPV   9.8     nRBC   0     Platelets   235     Potassium   3.2     PROTEIN TOTAL   7.4     RBC   4.00     RDW   14.0     SARS-CoV-2 RNA, Amplification, Qual Negative  Comment:  This  test utilizes isothermal nucleic acid amplification   technology to detect the SARS-CoV-2 RdRp nucleic acid segment.   The analytical sensitivity (limit of detection) is 125 genome   equivalents/mL.   A POSITIVE result implies infection with the SARS-CoV-2 virus;  the patient is presumed to be contagious.    A NEGATIVE result means that SARS-CoV-2 nucleic acids are not  present above the limit of detection. A NEGATIVE result should be   treated as presumptive. It does not rule out the possibility of   COVID-19 and should not be the sole basis for treatment decisions.   If COVID-19 is strongly suspected based on clinical and exposure   history, re-testing using an alternate molecular assay should be   considered.   This test is only for use under the Food and Drug   Administration s Emergency Use Authorization (EUA).   Commercial kits are provided by [employer].   Performance characteristics of the EUA have been independently  verified by [facility] Department of  Pathology and Laboratory Medicine.   _________________________________________________________________  The ID NOW COVID-19 Letter of Authorization, along with the   authorized Fact Sheet for Healthcare Providers, the authorized Fact  Sheet for Patients, and authorized labeling are available on the FDA   website:  www.fda.gov/MedicalDevices/Safety/EmergencySituations/gss086020.htm         Sodium   139     Troponin I   <0.01     WBC   4.03         All pertinent labs within the past 24 hours have been reviewed.    Significant Imaging: I have reviewed and interpreted all pertinent imaging results/findings within the past 24 hours.    Assessment/Plan:     * Palpitations  08/07/2020:  Admit to medical-surgical unit on telemetry  Continue home medications  Serial troponin levels overnight  Repeat labs in the a.m. to include CBC and CMP  Monitor expectantly for further palpitations or other ECG changes  Interrogate pacer by company  representative which will be done later today  If no further problems or abnormalities overnight will discharge to home in the AM        VTE Risk Mitigation (From admission, onward)    None             Juan Luis Ribera MD  Department of Hospital Medicine   [facility]

## 2020-08-07 NOTE — ED NOTES
"Spoke with Urban Francis, representative from [employer], states "It will be later on this evening. I am waiting on a case and it will be a minimum of three hours."   Dr. Farrell notified.      Emely Ortega RN  08/07/20 0903    "

## 2020-08-07 NOTE — ED NOTES
"Spoke with Urban Francis, [employer] representative, to give update that patient was admitted to hospital and the admitting physician was still expecting the pacemaker to be interrogated today. States "I am just finishing up here. It is like an hour drive, but I will be there this afternoon. Thanks for the update." Attempted to call AMALIA Mckeon to give update. Awaiting call back.      Emely Ortega RN  08/07/20 [id number]    "

## 2020-08-07 NOTE — ED NOTES
Patient placed on continuous cardiac monitor, automatic blood pressure cuff and continuous pulse oximeter.     Alexys Walsh RN  08/07/20 0984

## 2020-08-07 NOTE — ASSESSMENT & PLAN NOTE
08/07/2020:  Admit to medical-surgical unit on telemetry  Continue home medications  Serial troponin levels overnight  Repeat labs in the a.m. to include CBC and CMP  Monitor expectantly for further palpitations or other ECG changes  Interrogate pacer by company representative which will be done later today  If no further problems or abnormalities overnight will discharge to home in the AM

## 2020-08-07 NOTE — HOSPITAL COURSE
Emergency department evaluation:  Patient was evaluated the emergency department and this revealed sodium 139 potassium 3.2 chloride 1066 bicarb 23 glucose 165 and BUN creatinine were 171.0 with GFR greater than 60  BNP was 27  D-dimer was 0.68  White blood cell count 4.0 hemoglobin 11.3 hematocrit 34.7 platelets 235 and differential 49 granulocytes of 42 lymphocytes  Neutrophil  Initial troponin level was 0.01    08/08/2020:  Patient states feeling well today having no further episodes of palpitations, chest pain, diaphoresis, lightheadedness or syncope.   Labs were unremarkable with all troponin levels being normal no other significant findings noted.  Patient will be discharged home to resume care with her primary care physician and continue home medications as prescribed prior to this admission

## 2020-08-07 NOTE — ED NOTES
"Called [employer] to have representative come out to interrogate pacemaker per Dr. Farrell, spoke with . States "I will page the representative and have them give you a call."      Emely Ortega RN  08/07/20 7991    "

## 2020-08-08 VITALS
HEIGHT: 65 IN | TEMPERATURE: 98 F | SYSTOLIC BLOOD PRESSURE: 127 MMHG | BODY MASS INDEX: 27.16 KG/M2 | HEART RATE: 61 BPM | DIASTOLIC BLOOD PRESSURE: 58 MMHG | OXYGEN SATURATION: 98 % | RESPIRATION RATE: 18 BRPM | WEIGHT: 163 LBS

## 2020-08-08 LAB
ALBUMIN SERPL BCP-MCNC: 3.5 G/DL (ref 3.5–5.2)
ALP SERPL-CCNC: 58 U/L (ref 55–135)
ALT SERPL W/O P-5'-P-CCNC: 23 U/L (ref 10–44)
ANION GAP SERPL CALC-SCNC: 6 MMOL/L (ref 8–16)
AORTIC ROOT ANNULUS: 2.72 CM
AORTIC VALVE CUSP SEPERATION: 1.69 CM
AST SERPL-CCNC: 23 U/L (ref 10–40)
AV INDEX (PROSTH): 0.74
AV MEAN GRADIENT: 7 MMHG
AV PEAK GRADIENT: 12 MMHG
AV VALVE AREA: 2.13 CM2
AV VELOCITY RATIO: 0.73
BASOPHILS # BLD AUTO: 0.03 K/UL (ref 0–0.2)
BASOPHILS NFR BLD: 0.7 % (ref 0–1.9)
BILIRUB SERPL-MCNC: 0.5 MG/DL (ref 0.1–1)
BSA FOR ECHO PROCEDURE: 1.84 M2
BUN SERPL-MCNC: 11 MG/DL (ref 8–23)
CALCIUM SERPL-MCNC: 8.2 MG/DL (ref 8.7–10.5)
CHLORIDE SERPL-SCNC: 112 MMOL/L (ref 95–110)
CHOLEST SERPL-MCNC: 117 MG/DL (ref 120–199)
CHOLEST/HDLC SERPL: 2.9 {RATIO} (ref 2–5)
CO2 SERPL-SCNC: 24 MMOL/L (ref 23–29)
CREAT SERPL-MCNC: 0.8 MG/DL (ref 0.5–1.4)
CV ECHO LV RWT: 0.57 CM
DIFFERENTIAL METHOD: ABNORMAL
DOP CALC AO PEAK VEL: 1.75 M/S
DOP CALC AO VTI: 42.26 CM
DOP CALC LVOT AREA: 2.9 CM2
DOP CALC LVOT DIAMETER: 1.92 CM
DOP CALC LVOT PEAK VEL: 1.28 M/S
DOP CALC LVOT STROKE VOLUME: 90.17 CM3
DOP CALCLVOT PEAK VEL VTI: 31.16 CM
E WAVE DECELERATION TIME: 193.6 MSEC
E/A RATIO: 0.88
E/E' RATIO: 11.5 M/S
ECHO LV POSTERIOR WALL: 1.16 CM (ref 0.6–1.1)
EOSINOPHIL # BLD AUTO: 0.1 K/UL (ref 0–0.5)
EOSINOPHIL NFR BLD: 1.4 % (ref 0–8)
ERYTHROCYTE [DISTWIDTH] IN BLOOD BY AUTOMATED COUNT: 13.9 % (ref 11.5–14.5)
EST. GFR  (AFRICAN AMERICAN): >60 ML/MIN/1.73 M^2
EST. GFR  (NON AFRICAN AMERICAN): >60 ML/MIN/1.73 M^2
FRACTIONAL SHORTENING: 33 % (ref 28–44)
GLUCOSE SERPL-MCNC: 93 MG/DL (ref 70–110)
HCT VFR BLD AUTO: 31.2 % (ref 37–48.5)
HDLC SERPL-MCNC: 40 MG/DL (ref 40–75)
HDLC SERPL: 34.2 % (ref 20–50)
HGB BLD-MCNC: 10 G/DL (ref 12–16)
IMM GRANULOCYTES # BLD AUTO: 0.01 K/UL (ref 0–0.04)
IMM GRANULOCYTES NFR BLD AUTO: 0.2 % (ref 0–0.5)
INTERVENTRICULAR SEPTUM: 1.16 CM (ref 0.6–1.1)
IVRT: 38.06 MSEC
LA MAJOR: 4.2 CM
LA MINOR: 2.5 CM
LDLC SERPL CALC-MCNC: 63 MG/DL (ref 63–159)
LEFT ATRIUM SIZE: 4.02 CM
LEFT INTERNAL DIMENSION IN SYSTOLE: 2.73 CM (ref 2.1–4)
LEFT VENTRICLE DIASTOLIC VOLUME INDEX: 39.92 ML/M2
LEFT VENTRICLE DIASTOLIC VOLUME: 72.4 ML
LEFT VENTRICLE MASS INDEX: 89 G/M2
LEFT VENTRICLE SYSTOLIC VOLUME INDEX: 15.2 ML/M2
LEFT VENTRICLE SYSTOLIC VOLUME: 27.65 ML
LEFT VENTRICULAR INTERNAL DIMENSION IN DIASTOLE: 4.06 CM (ref 3.5–6)
LEFT VENTRICULAR MASS: 160.99 G
LV LATERAL E/E' RATIO: 11.5 M/S
LV SEPTAL E/E' RATIO: 11.5 M/S
LYMPHOCYTES # BLD AUTO: 1.7 K/UL (ref 1–4.8)
LYMPHOCYTES NFR BLD: 39.9 % (ref 18–48)
MCH RBC QN AUTO: 27.8 PG (ref 27–31)
MCHC RBC AUTO-ENTMCNC: 32.1 G/DL (ref 32–36)
MCV RBC AUTO: 87 FL (ref 82–98)
MONOCYTES # BLD AUTO: 0.4 K/UL (ref 0.3–1)
MONOCYTES NFR BLD: 8.6 % (ref 4–15)
MV PEAK A VEL: 1.04 M/S
MV PEAK E VEL: 0.92 M/S
MV PEAK GRADIENT: 47 MMHG
MV STENOSIS PRESSURE HALF TIME: 70.99 MS
MV VALVE AREA P 1/2 METHOD: 3.1 CM2
NEUTROPHILS # BLD AUTO: 2.1 K/UL (ref 1.8–7.7)
NEUTROPHILS NFR BLD: 49.2 % (ref 38–73)
NONHDLC SERPL-MCNC: 77 MG/DL
NRBC BLD-RTO: 0 /100 WBC
PISA MRMAX VEL: 0.03 M/S
PISA TR MAX VEL: 2.25 M/S
PLATELET # BLD AUTO: 222 K/UL (ref 150–350)
PMV BLD AUTO: 9.9 FL (ref 9.2–12.9)
POTASSIUM SERPL-SCNC: 3.7 MMOL/L (ref 3.5–5.1)
PROT SERPL-MCNC: 6.3 G/DL (ref 6–8.4)
PV MEAN GRADIENT: 3 MMHG
PV PEAK VELOCITY: 1.09 CM/S
RA MAJOR: 4.08 CM
RA PRESSURE: 3 MMHG
RA WIDTH: 1.4 CM
RBC # BLD AUTO: 3.6 M/UL (ref 4–5.4)
RIGHT VENTRICULAR END-DIASTOLIC DIMENSION: 3.15 CM
SODIUM SERPL-SCNC: 142 MMOL/L (ref 136–145)
TDI LATERAL: 0.08 M/S
TDI SEPTAL: 0.08 M/S
TDI: 0.08 M/S
TR MAX PG: 20 MMHG
TRICUSPID ANNULAR PLANE SYSTOLIC EXCURSION: 2.34 CM
TRIGL SERPL-MCNC: 70 MG/DL (ref 30–150)
TROPONIN I SERPL DL<=0.01 NG/ML-MCNC: 0.01 NG/ML (ref 0.02–0.5)
TROPONIN I SERPL DL<=0.01 NG/ML-MCNC: <0.01 NG/ML (ref 0.02–0.5)
TROPONIN I SERPL DL<=0.01 NG/ML-MCNC: <0.01 NG/ML (ref 0.02–0.5)
TV REST PULMONARY ARTERY PRESSURE: 23 MMHG
WBC # BLD AUTO: 4.21 K/UL (ref 3.9–12.7)

## 2020-08-08 PROCEDURE — G0378 HOSPITAL OBSERVATION PER HR: HCPCS

## 2020-08-08 PROCEDURE — 25000003 PHARM REV CODE 250: Performed by: INTERNAL MEDICINE

## 2020-08-08 PROCEDURE — 96361 HYDRATE IV INFUSION ADD-ON: CPT | Performed by: INTERNAL MEDICINE

## 2020-08-08 PROCEDURE — 99217 PR OBSERVATION CARE DISCHARGE: ICD-10-PCS | Mod: ,,, | Performed by: INTERNAL MEDICINE

## 2020-08-08 PROCEDURE — 85025 COMPLETE CBC W/AUTO DIFF WBC: CPT

## 2020-08-08 PROCEDURE — 84484 ASSAY OF TROPONIN QUANT: CPT

## 2020-08-08 PROCEDURE — 36415 COLL VENOUS BLD VENIPUNCTURE: CPT

## 2020-08-08 PROCEDURE — 80061 LIPID PANEL: CPT

## 2020-08-08 PROCEDURE — 94760 N-INVAS EAR/PLS OXIMETRY 1: CPT

## 2020-08-08 PROCEDURE — 99217 PR OBSERVATION CARE DISCHARGE: CPT | Mod: ,,, | Performed by: INTERNAL MEDICINE

## 2020-08-08 PROCEDURE — 80053 COMPREHEN METABOLIC PANEL: CPT

## 2020-08-08 PROCEDURE — 84484 ASSAY OF TROPONIN QUANT: CPT | Mod: 91

## 2020-08-08 RX ADMIN — SODIUM CHLORIDE: 0.9 INJECTION, SOLUTION INTRAVENOUS at 06:08

## 2020-08-08 RX ADMIN — AMLODIPINE BESYLATE 10 MG: 2.5 TABLET ORAL at 10:08

## 2020-08-08 RX ADMIN — LISINOPRIL 20 MG: 10 TABLET ORAL at 10:08

## 2020-08-08 RX ADMIN — ASPIRIN 81 MG 81 MG: 81 TABLET ORAL at 10:08

## 2020-08-08 RX ADMIN — METOPROLOL TARTRATE 100 MG: 25 TABLET, FILM COATED ORAL at 10:08

## 2020-08-08 NOTE — NURSING
New orders received for discharge, patient is agreeable with discharge. PIV removed, catheter tip intact. Dressing applied. Discharge teaching done at bedside, verbalized understanding. Home medications reviewed, appointments given. Will d/c home with all belongings per w/c.

## 2020-08-08 NOTE — ASSESSMENT & PLAN NOTE
08/07/2020:  Admit to medical-surgical unit on telemetry  Continue home medications  Serial troponin levels overnight  Repeat labs in the a.m. to include CBC and CMP  Monitor expectantly for further palpitations or other ECG changes  Interrogate pacer by company representative which will be done later today  If no further problems or abnormalities overnight will discharge to home in the AM    08/08/2020:    Discharge patient to home  Follow-up with primary care physician of choice within 1 week new continue home medications as previously prescribed

## 2020-08-08 NOTE — PLAN OF CARE
Problem: Adult Inpatient Plan of Care  Goal: Plan of Care Review  Outcome: Ongoing, Progressing     Problem: Adult Inpatient Plan of Care  Goal: Patient-Specific Goal (Individualization)  Outcome: Ongoing, Progressing     Problem: Fall Injury Risk  Goal: Absence of Fall and Fall-Related Injury  Outcome: Ongoing, Progressing     Problem: Pain Acute  Goal: Optimal Pain Control  Outcome: Ongoing, Progressing     Problem: Arrhythmia/Dysrhythmia  Goal: Normalized Cardiac Rhythm  Outcome: Ongoing, Progressing

## 2020-08-08 NOTE — DISCHARGE SUMMARY
[facility] Medicine  Discharge Summary      Patient Name: Alayna Bergman  MRN: [id number]  Admission Date: 8/7/2020  Hospital Length of Stay: 0 days  Discharge Date and Time:  08/08/2020 1:14 PM  Attending Physician: Juan Luis Ribera MD   Discharging Provider: Juan Luis Ribera MD  Primary Care Provider: No primary care provider on file.      HPI:   Patient is a 69-year-old female that presented to the emergency department this morning complaining of feeling that her heart was speeding up in slowing down.  Patient also complains of palpitations with mild shortness of breath.  Patient explained this by saying when these occurred she felt like she had to take a deep breath.  She denied this was associated with any chest pain, diaphoresis, nausea, vomiting, but was associated with feeling of mild shortness of breath and palpitations.  Patient has a past medical history of pacemaker placement due to a third-degree heart block.  Patient does seem be functioning correctly by a CD however patient a representative will be out today to interrogate her pacer.  At the time of my visit after admission the patient stated that the feeling in her chest had disappeared and he was eating and tolerating her lunch.  Otherwise this patient has a history of hypertension.    * No surgery found *      Hospital Course:   Emergency department evaluation:  Patient was evaluated the emergency department and this revealed sodium 139 potassium 3.2 chloride 1066 bicarb 23 glucose 165 and BUN creatinine were 171.0 with GFR greater than 60  BNP was 27  D-dimer was 0.68  White blood cell count 4.0 hemoglobin 11.3 hematocrit 34.7 platelets 235 and differential 49 granulocytes of 42 lymphocytes  Neutrophil  Initial troponin level was 0.01    08/08/2020:  Patient states feeling well today having no further episodes of palpitations, chest pain, diaphoresis, lightheadedness or syncope.   Labs were unremarkable with  all troponin levels being normal no other significant findings noted.  Patient will be discharged home to resume care with her primary care physician and continue home medications as prescribed prior to this admission     Consults:     * Palpitations  08/07/2020:  Admit to medical-surgical unit on telemetry  Continue home medications  Serial troponin levels overnight  Repeat labs in the a.m. to include CBC and CMP  Monitor expectantly for further palpitations or other ECG changes  Interrogate pacer by company representative which will be done later today  If no further problems or abnormalities overnight will discharge to home in the AM    08/08/2020:    Discharge patient to home  Follow-up with primary care physician of choice within 1 week new continue home medications as previously prescribed      Final Active Diagnoses:    Diagnosis Date Noted POA    PRINCIPAL PROBLEM:  Palpitations [R00.2] 08/07/2020 Yes      Problems Resolved During this Admission:       Discharged Condition: stable    Disposition:     Follow Up:  Follow-up Information     Please follow up.    Why: PCP in 1 week post discharge               Patient Instructions:   No discharge procedures on file.    Significant Diagnostic Studies: Labs: All labs within the past 24 hours have been reviewed    Pending Diagnostic Studies:     Procedure Component Value Units Date/Time    EKG 12-lead [[id number]]     Order Status: Sent Lab Status: No result     Troponin I [[id number]]     Order Status: Sent Lab Status: No result     Specimen: Blood     Troponin I [[id number]] Collected: 08/08/20 0650    Order Status: Sent Lab Status: In process Updated: 08/08/20 0650    Specimen: Blood          Medications:  Reconciled Home Medications:      Medication List      CONTINUE taking these medications    amLODIPine 10 MG tablet  Commonly known as: NORVASC  Take 10 mg by mouth once daily.     aspirin 81 MG Chew  Take 81 mg by mouth once daily.     atorvastatin 10 MG  tablet  Commonly known as: LIPITOR  Take 10 mg by mouth once daily.     lisinopriL 20 MG tablet  Commonly known as: PRINIVIL,ZESTRIL  Take 20 mg by mouth once daily.     metoprolol tartrate 100 MG tablet  Commonly known as: LOPRESSOR  Take 100 mg by mouth 2 (two) times daily.     pseudoephedrine 30 MG tablet  Commonly known as: SUDAFED  Take 30 mg by mouth every 4 (four) hours as needed for Congestion.            Indwelling Lines/Drains at time of discharge:   Lines/Drains/Airways     None                 Time spent on the discharge of patient: 30 minutes  Patient was seen and examined on the date of discharge and determined to be suitable for discharge.         Juan Luis Ribera MD  Department of Hospital Medicine  [facility]

## 2020-08-10 NOTE — PLAN OF CARE
08/07/20 1600   Discharge Assessment   Assessment Type Discharge Planning Assessment   Confirmed/corrected address and phone number on facesheet? Yes  (Pt not registered at the time of this assessment. SW obtained demographic & insurance information and provided to registration.)   Assessment information obtained from? Patient   Expected Length of Stay (days) 1   Communicated expected length of stay with patient/caregiver yes   Prior to hospitilization cognitive status: Alert/Oriented   Prior to hospitalization functional status: Independent   Current cognitive status: Alert/Oriented   Current Functional Status: Independent   Facility Arrived From: Pt visitiing the area from [place]   Lives With alone   Able to Return to Prior Arrangements yes   Is patient able to care for self after discharge? Yes   Who are your caregiver(s) and their phone number(s)? self   Patient's perception of discharge disposition home or selfcare   Readmission Within the Last 30 Days no previous admission in last 30 days   Patient currently being followed by outpatient case management? No   Patient currently receives any other outside agency services? No   Equipment Currently Used at Home none   Do you have any problems affording any of your prescribed medications? No   Is the patient taking medications as prescribed? yes   Does the patient have transportation home? Yes   Transportation Anticipated car, drives self   Dialysis Name and Scheduled days n/a   Does the patient receive services at the [facility]mad[facility] Clinic? No   Discharge Plan A Home   DME Needed Upon Discharge  none   Patient/Family in Agreement with Plan yes     Pt is an independent female that was here visiting family from [place], [place] when she experienced issues that led to her hospitalization. She reports being independent and visits frequently for 1-2 weeks each time. She reports no needs for discharge and plans to return to [place] on Monday, 8/10/20. Pt verbally states she  has Medicare and BCBS, but does not have her ID cards to provide. Sw encouraged the pt to provide this information to the hospital after she returns home and obtains. Informed registration of this for Medicare search. There are no other needs identified.

## 2021-03-25 ENCOUNTER — HOSPITAL ENCOUNTER (EMERGENCY)
Facility: HOSPITAL | Age: 71
Discharge: HOME | End: 2021-03-25
Attending: EMERGENCY MEDICINE
Payer: MEDICARE

## 2021-03-25 VITALS
HEART RATE: 60 BPM | RESPIRATION RATE: 18 BRPM | WEIGHT: 163 LBS | BODY MASS INDEX: 27.16 KG/M2 | DIASTOLIC BLOOD PRESSURE: 70 MMHG | HEIGHT: 65 IN | TEMPERATURE: 98 F | SYSTOLIC BLOOD PRESSURE: 126 MMHG | OXYGEN SATURATION: 100 %

## 2021-03-25 DIAGNOSIS — S16.1XXA CERVICAL STRAIN, ACUTE, INITIAL ENCOUNTER: ICD-10-CM
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
## 2021-03-25 PROCEDURE — 72125 CT NECK SPINE W/O DYE: CPT | Mod: TC

## 2021-03-25 PROCEDURE — 70450 CT HEAD WITHOUT CONTRAST: ICD-10-PCS | Mod: 26,,, | Performed by: RADIOLOGY

## 2021-03-25 PROCEDURE — 70450 CT HEAD/BRAIN W/O DYE: CPT | Mod: TC

## 2021-03-25 PROCEDURE — 99284 EMERGENCY DEPT VISIT MOD MDM: CPT | Mod: 25

## 2021-03-25 PROCEDURE — 72125 CT CERVICAL SPINE WITHOUT CONTRAST: ICD-10-PCS | Mod: 26,,, | Performed by: RADIOLOGY

## 2021-03-25 PROCEDURE — 72125 CT NECK SPINE W/O DYE: CPT | Mod: 26,,, | Performed by: RADIOLOGY

## 2021-03-25 PROCEDURE — 70450 CT HEAD/BRAIN W/O DYE: CPT | Mod: 26,,, | Performed by: RADIOLOGY

## 2022-06-05 ENCOUNTER — HOSPITAL ENCOUNTER (EMERGENCY)
Facility: HOSPITAL | Age: 72
Discharge: HOME | End: 2022-06-05
Attending: FAMILY MEDICINE
Payer: MEDICARE

## 2022-06-05 VITALS
OXYGEN SATURATION: 98 % | WEIGHT: 154 LBS | HEART RATE: 60 BPM | RESPIRATION RATE: 18 BRPM | TEMPERATURE: 98 F | BODY MASS INDEX: 25.66 KG/M2 | SYSTOLIC BLOOD PRESSURE: 127 MMHG | DIASTOLIC BLOOD PRESSURE: 65 MMHG | HEIGHT: 65 IN

## 2022-06-05 DIAGNOSIS — S16.1XXA STRAIN OF NECK MUSCLE, INITIAL ENCOUNTER: ICD-10-CM

## 2022-06-05 DIAGNOSIS — S09.90XA TRAUMATIC INJURY OF HEAD, INITIAL ENCOUNTER: ICD-10-CM

## 2022-06-05 DIAGNOSIS — M54.50 ACUTE BILATERAL LOW BACK PAIN WITHOUT SCIATICA: ICD-10-CM
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
## 2022-06-05 DIAGNOSIS — M25.559 HIP PAIN: ICD-10-CM

## 2022-06-05 DIAGNOSIS — M25.569 KNEE PAIN: ICD-10-CM

## 2022-06-05 PROCEDURE — 72125 CT NECK SPINE W/O DYE: CPT | Mod: TC

## 2022-06-05 PROCEDURE — 72125 CT NECK SPINE W/O DYE: CPT | Mod: 26,,, | Performed by: RADIOLOGY

## 2022-06-05 PROCEDURE — 73562 XR KNEE 3 VIEW LEFT: ICD-10-PCS | Mod: 26,LT,, | Performed by: RADIOLOGY

## 2022-06-05 PROCEDURE — 73562 X-RAY EXAM OF KNEE 3: CPT | Mod: 26,LT,, | Performed by: RADIOLOGY

## 2022-06-05 PROCEDURE — 25000003 PHARM REV CODE 250: Performed by: NURSE PRACTITIONER

## 2022-06-05 PROCEDURE — 70450 CT HEAD/BRAIN W/O DYE: CPT | Mod: TC

## 2022-06-05 PROCEDURE — 72100 X-RAY EXAM L-S SPINE 2/3 VWS: CPT | Mod: TC,FY

## 2022-06-05 PROCEDURE — 72125 CT CERVICAL SPINE WITHOUT CONTRAST: ICD-10-PCS | Mod: 26,,, | Performed by: RADIOLOGY

## 2022-06-05 PROCEDURE — 73502 XR HIP WITH PELVIS WHEN PERFORMED, 2 OR 3 VIEWS LEFT: ICD-10-PCS | Mod: 26,LT,, | Performed by: RADIOLOGY

## 2022-06-05 PROCEDURE — 73502 X-RAY EXAM HIP UNI 2-3 VIEWS: CPT | Mod: TC,FY,LT

## 2022-06-05 PROCEDURE — 73562 X-RAY EXAM OF KNEE 3: CPT | Mod: TC,FY,LT

## 2022-06-05 PROCEDURE — 73502 X-RAY EXAM HIP UNI 2-3 VIEWS: CPT | Mod: 26,LT,, | Performed by: RADIOLOGY

## 2022-06-05 PROCEDURE — 70450 CT HEAD/BRAIN W/O DYE: CPT | Mod: 26,,, | Performed by: RADIOLOGY

## 2022-06-05 PROCEDURE — 72100 X-RAY EXAM L-S SPINE 2/3 VWS: CPT | Mod: 26,,, | Performed by: RADIOLOGY

## 2022-06-05 PROCEDURE — 72100 XR LUMBAR SPINE AP AND LATERAL: ICD-10-PCS | Mod: 26,,, | Performed by: RADIOLOGY

## 2022-06-05 PROCEDURE — 99285 EMERGENCY DEPT VISIT HI MDM: CPT | Mod: 25

## 2022-06-05 PROCEDURE — 70450 CT HEAD WITHOUT CONTRAST: ICD-10-PCS | Mod: 26,,, | Performed by: RADIOLOGY

## 2022-06-05 RX ORDER — ACETAMINOPHEN 500 MG
1000 TABLET ORAL
Status: COMPLETED | OUTPATIENT
Start: 2022-06-05 | End: 2022-06-05

## 2022-06-05 RX ADMIN — ACETAMINOPHEN 1000 MG: 500 TABLET ORAL at 08:06

## 2022-06-05 NOTE — ED TRIAGE NOTES
Tripped and fell c/o neck head left arm and knee pain did not get knocked out ambulatory to triage

## 2022-06-06 NOTE — DISCHARGE INSTRUCTIONS
Continue to take Tylenol as needed for pain.  Rest.  You can use ice and heat as needed.  Follow-up with your primary care provider for close follow-up.  Return to the emergency room for any worsening pain, vomiting, condition changes, loss of urine or bowel continence, numbness or tingling the groin, or any worsening symptoms or concerns.

## 2022-06-06 NOTE — ED PROVIDER NOTES
Encounter Date: 2022       History     Chief Complaint   Patient presents with    Fall     Tripped and fell c/o left knee neck head and left arm pain      71-year-old female presents after a fall.  She states that she was at [employer] restaurant and she fell after coming off of a platform in the restaurant.  She states that she hit her head and neck hard on the ground.  She reports that she had no loss of consciousness.  She states that she is not on any blood thinners.  She is reporting right now that she has a headache, neck pain, low back pain, left knee pain, left hip pain.  She stated that she ambulate after the fall.  She states she drove herself here.  She denies any urine or bowel incontinence or saddle paresthesia.        Review of patient's allergies indicates:  No Known Allergies  Past Medical History:   Diagnosis Date    Hyperlipemia     Hypertension      Past Surgical History:   Procedure Laterality Date     SECTION      INSERTION OF PACEMAKER       History reviewed. No pertinent family history.  Social History     Tobacco Use    Smoking status: Never Smoker    Smokeless tobacco: Never Used   Substance Use Topics    Alcohol use: Not Currently     Comment: Very rare    Drug use: Never     Review of Systems   Constitutional: Negative for fever.   Eyes: Negative for pain and visual disturbance.   Respiratory: Negative for apnea, choking, chest tightness, shortness of breath, wheezing and stridor.    Cardiovascular: Negative for chest pain, palpitations and leg swelling.   Gastrointestinal: Negative for abdominal distention.   Musculoskeletal: Positive for arthralgias (Left hip pain, left knee pain, neck pain) and back pain.   Neurological: Positive for headaches. Negative for dizziness, tremors, seizures, syncope, facial asymmetry, speech difficulty, weakness, light-headedness and numbness.   All other systems reviewed and are negative.      Physical Exam     Initial Vitals  [06/05/22 1711]   BP Pulse Resp Temp SpO2   129/67 63 18 98.1 °F (36.7 °C) 97 %      MAP       --         Physical Exam    Nursing note and vitals reviewed.  Constitutional: She appears well-developed and well-nourished. She is not diaphoretic.   HENT:   Head: Normocephalic and atraumatic.   TMs clear.  No rhinorrhea noted.  No facial tenderness or bruising noted.   Eyes: EOM are normal. Pupils are equal, round, and reactive to light. Right eye exhibits no discharge. Left eye exhibits no discharge. No scleral icterus.   Neck:   Normal range of motion.  Cardiovascular: Normal rate, regular rhythm and normal heart sounds. Exam reveals no gallop and no friction rub.    No murmur heard.  Pulmonary/Chest: Breath sounds normal. No respiratory distress. She has no wheezes. She has no rhonchi. She has no rales. She exhibits no tenderness.   Abdominal: Abdomen is soft. She exhibits no distension.   Musculoskeletal:         General: Normal range of motion.      Cervical back: Normal range of motion.      Comments: Tenderness noted to left knee and left hip.  Full range of motion to bilateral ankles, bilateral knees, bilateral hips.  Full range of motion to bilateral upper extremities.     Neurological: She is alert and oriented to person, place, and time. She has normal strength. She displays normal reflexes. No cranial nerve deficit or sensory deficit. GCS score is 15. GCS eye subscore is 4. GCS verbal subscore is 5. GCS motor subscore is 6.   Skin: Skin is warm and dry. Capillary refill takes less than 2 seconds.   Psychiatric: She has a normal mood and affect.         ED Course   Procedures  Labs Reviewed - No data to display       Imaging Results          X-Ray Hip 2 or 3 views Left (with Pelvis when performed) (Preliminary result)  Result time 06/05/22 20:28:18    ED Interpretation by Mahesh Godfrey MD (06/05/22 20:28:18, [facility] Emergency Dept, Emergency Medicine)    No fracture dislocation of the hip                              X-Ray Knee 3 View Left (Preliminary result)  Result time 06/05/22 20:25:47    ED Interpretation by Mahesh Godfrey MD (06/05/22 20:25:47, [facility] Emergency Dept, Emergency Medicine)    Knee shows no dislocation or fractures                             X-Ray Lumbar Spine Ap And Lateral (Preliminary result)  Result time 06/05/22 20:27:21    ED Interpretation by Mahesh Godfrey MD (06/05/22 20:27:21, [facility] Emergency Dept, Emergency Medicine)    No evidence of any fracture dislocation, has calcified aorta                             CT Cervical Spine Without Contrast (Preliminary result)  Result time 06/05/22 21:23:49    ED Interpretation by Mahesh Godfrey MD (06/05/22 21:23:49, [facility] Dept, Emergency Medicine)    Acute findings on CT                             CT Head Without Contrast (In process)                  Medications   acetaminophen tablet 1,000 mg (1,000 mg Oral Given 6/5/22 2033)     Medical Decision Making:   Differential Diagnosis:   Head trauma, intracranial hemorrhage, cervical fracture, cervical strain, musculoskeletal strain, hip fracture or sprain, knee fracture or sprain, low back pain  ED Management:  71-year-old presents after a fall [place] [employer] prior to arrival.  She reports of a headache, neck pain, left hip pain, left knee pain, and low back pain.  She denies any shortness of breath or chest pain.  She denies any loss of consciousness.  She denies any current blood thinners.  She denies any bladder or bowel incontinence or saddle paresthesia.  She is neurologically intact.  X-rays of the knee, hip, lumbar spine are reviewed by myself and Dr. Godfrey.  We do not see any acute fractures or findings on any of the x-rays.  CT of head is negative for any acute findings read by [facility]. CT of cervical spine is negative for any acute fracture or findings. Cervical collar removed.  Full range of motion to neck.  No step-off noted to cervical spine.  Full range of motion  to back.  Patient was given Tylenol and states that her pain is completely resolved.  Ambulates normal in exam room.  Patient appears safe to go home at this time.  Have advised her to follow-up with her primary care physician.  Return to the emergency room for any worsening symptoms or concerns.             ED Course as of 06/05/22 2130   Sun Jun 05, 2022 2025 X-Ray Knee 3 View Left [PW]   2027 X-Ray Lumbar Spine Ap And Lateral [PW]   2028 X-Ray Hip 2 or 3 views Left (with Pelvis when performed) [PW]   2123 CT Cervical Spine Without Contrast [PW]   2123 Patient history and physical obtained, neurologic exam normal, no pain in neck or back at present.  Advised follow-up with her primary care provider.  She states she has a pulmonary him next week. [PW]      ED Course User Index  [PW] Mahesh Godfrey MD             Clinical Impression:   Final diagnoses:  [M25.569] Knee pain  [M25.559] Hip pain  [W19.XXXA] Fall, initial encounter (Primary)  [S09.90XA] Traumatic injury of head, initial encounter  [M54.50] Acute bilateral low back pain without sciatica  [S16.1XXA] Strain of neck muscle, initial encounter          ED Disposition Condition    Discharge Stable        ED Prescriptions     None        Follow-up Information     Follow up With Specialties Details Why Contact Info    Todd Noel MD Family Medicine In 3 days  [address] [place] [address] [phone]      [facility] Emergency Dept Emergency Medicine  As needed, If symptoms worsen [address] [facility] [address] [phone]           Aishwarya Cam NP  06/05/22 2133

## 2022-10-13 ENCOUNTER — HOSPITAL ENCOUNTER (EMERGENCY)
Facility: HOSPITAL | Age: 72
Discharge: HOME | End: 2022-10-13
Attending: EMERGENCY MEDICINE
Payer: MEDICARE

## 2022-10-13 VITALS
BODY MASS INDEX: 27.49 KG/M2 | RESPIRATION RATE: 16 BRPM | SYSTOLIC BLOOD PRESSURE: 102 MMHG | WEIGHT: 165 LBS | OXYGEN SATURATION: 96 % | DIASTOLIC BLOOD PRESSURE: 52 MMHG | HEIGHT: 65 IN | HEART RATE: 62 BPM | TEMPERATURE: 98 F

## 2022-10-13 DIAGNOSIS — R53.83 FATIGUE: ICD-10-CM

## 2022-10-13 DIAGNOSIS — E86.0 DEHYDRATION: Primary | ICD-10-CM

## 2022-10-13 LAB
ALBUMIN SERPL BCP-MCNC: 4.5 G/DL (ref 3.5–5.2)
ALP SERPL-CCNC: 80 U/L (ref 55–135)
ALT SERPL W/O P-5'-P-CCNC: 16 U/L (ref 10–44)
AMPHET+METHAMPHET UR QL: NEGATIVE
ANION GAP SERPL CALC-SCNC: 11 MMOL/L (ref 8–16)
AST SERPL-CCNC: 21 U/L (ref 10–40)
BARBITURATES UR QL SCN>200 NG/ML: NEGATIVE
BASOPHILS # BLD AUTO: 0.04 K/UL (ref 0–0.2)
BASOPHILS NFR BLD: 0.8 % (ref 0–1.9)
BENZODIAZ UR QL SCN>200 NG/ML: NEGATIVE
BILIRUB SERPL-MCNC: 0.4 MG/DL (ref 0.1–1)
BILIRUB UR QL STRIP: NEGATIVE
BUN SERPL-MCNC: 35 MG/DL (ref 8–23)
BZE UR QL SCN: NEGATIVE
CALCIUM SERPL-MCNC: 9.5 MG/DL (ref 8.7–10.5)
CANNABINOIDS UR QL SCN: NEGATIVE
CHLORIDE SERPL-SCNC: 108 MMOL/L (ref 95–110)
CLARITY UR: CLEAR
CO2 SERPL-SCNC: 22 MMOL/L (ref 23–29)
COLOR UR: YELLOW
CREAT SERPL-MCNC: 2 MG/DL (ref 0.5–1.4)
CREAT UR-MCNC: 108 MG/DL (ref 15–325)
DIFFERENTIAL METHOD: ABNORMAL
EOSINOPHIL # BLD AUTO: 0.1 K/UL (ref 0–0.5)
EOSINOPHIL NFR BLD: 1 % (ref 0–8)
ERYTHROCYTE [DISTWIDTH] IN BLOOD BY AUTOMATED COUNT: 14.1 % (ref 11.5–14.5)
EST. GFR  (NO RACE VARIABLE): 26.2 ML/MIN/1.73 M^2
ETHANOL SERPL-MCNC: <10 MG/DL (ref 0–10)
GLUCOSE SERPL-MCNC: 127 MG/DL (ref 70–110)
GLUCOSE UR QL STRIP: NEGATIVE
HCT VFR BLD AUTO: 33.8 % (ref 37–48.5)
HGB BLD-MCNC: 11.1 G/DL (ref 12–16)
HGB UR QL STRIP: NEGATIVE
IMM GRANULOCYTES # BLD AUTO: 0.02 K/UL (ref 0–0.04)
IMM GRANULOCYTES NFR BLD AUTO: 0.4 % (ref 0–0.5)
INFLUENZA A, MOLECULAR: NEGATIVE
INFLUENZA B, MOLECULAR: NEGATIVE
KETONES UR QL STRIP: NEGATIVE
LEUKOCYTE ESTERASE UR QL STRIP: NEGATIVE
LYMPHOCYTES # BLD AUTO: 1 K/UL (ref 1–4.8)
LYMPHOCYTES NFR BLD: 20.4 % (ref 18–48)
MCH RBC QN AUTO: 28.6 PG (ref 27–31)
MCHC RBC AUTO-ENTMCNC: 32.8 G/DL (ref 32–36)
MCV RBC AUTO: 87 FL (ref 82–98)
METHADONE UR QL SCN>300 NG/ML: NEGATIVE
MONOCYTES # BLD AUTO: 0.4 K/UL (ref 0.3–1)
MONOCYTES NFR BLD: 7.3 % (ref 4–15)
NEUTROPHILS # BLD AUTO: 3.4 K/UL (ref 1.8–7.7)
NEUTROPHILS NFR BLD: 70.1 % (ref 38–73)
NITRITE UR QL STRIP: NEGATIVE
NRBC BLD-RTO: 0 /100 WBC
OPIATES UR QL SCN: NEGATIVE
PCP UR QL SCN>25 NG/ML: NEGATIVE
PH UR STRIP: 5 [PH] (ref 5–8)
PLATELET # BLD AUTO: 228 K/UL (ref 150–450)
PMV BLD AUTO: 10 FL (ref 9.2–12.9)
POTASSIUM SERPL-SCNC: 5.2 MMOL/L (ref 3.5–5.1)
PROT SERPL-MCNC: 8.5 G/DL (ref 6–8.4)
PROT UR QL STRIP: NEGATIVE
RBC # BLD AUTO: 3.88 M/UL (ref 4–5.4)
SARS-COV-2 RDRP RESP QL NAA+PROBE: NEGATIVE
SODIUM SERPL-SCNC: 141 MMOL/L (ref 136–145)
SP GR UR STRIP: 1.01 (ref 1–1.03)
SPECIMEN SOURCE: NORMAL
TOXICOLOGY INFORMATION: NORMAL
URN SPEC COLLECT METH UR: NORMAL
UROBILINOGEN UR STRIP-ACNC: NEGATIVE EU/DL
WBC # BLD AUTO: 4.91 K/UL (ref 3.9–12.7)

## 2022-10-13 PROCEDURE — U0002 COVID-19 LAB TEST NON-CDC: HCPCS | Performed by: EMERGENCY MEDICINE

## 2022-10-13 PROCEDURE — 87502 INFLUENZA DNA AMP PROBE: CPT | Performed by: EMERGENCY MEDICINE

## 2022-10-13 PROCEDURE — 85025 COMPLETE CBC W/AUTO DIFF WBC: CPT | Performed by: EMERGENCY MEDICINE

## 2022-10-13 PROCEDURE — 99284 EMERGENCY DEPT VISIT MOD MDM: CPT | Mod: 25

## 2022-10-13 PROCEDURE — 93010 EKG 12-LEAD: ICD-10-PCS | Mod: ,,, | Performed by: INTERNAL MEDICINE

## 2022-10-13 PROCEDURE — 93005 ELECTROCARDIOGRAM TRACING: CPT

## 2022-10-13 PROCEDURE — 82077 ASSAY SPEC XCP UR&BREATH IA: CPT | Performed by: EMERGENCY MEDICINE

## 2022-10-13 PROCEDURE — 96360 HYDRATION IV INFUSION INIT: CPT

## 2022-10-13 PROCEDURE — 80053 COMPREHEN METABOLIC PANEL: CPT | Performed by: EMERGENCY MEDICINE

## 2022-10-13 PROCEDURE — 80307 DRUG TEST PRSMV CHEM ANLYZR: CPT | Performed by: EMERGENCY MEDICINE

## 2022-10-13 PROCEDURE — 81003 URINALYSIS AUTO W/O SCOPE: CPT | Mod: 59 | Performed by: EMERGENCY MEDICINE

## 2022-10-13 PROCEDURE — 93010 ELECTROCARDIOGRAM REPORT: CPT | Mod: ,,, | Performed by: INTERNAL MEDICINE
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
## 2022-10-13 NOTE — DISCHARGE INSTRUCTIONS
Be sure to drink plenty of water for the next several days, and follow-up with your primary care provider within the next several days as well.  Return here for any worsening signs or symptoms.

## 2025-04-06 ENCOUNTER — HOSPITAL ENCOUNTER (INPATIENT)
Facility: HOSPITAL | Age: 75
LOS: 3 days | Discharge: HOME | DRG: 300 | End: 2025-04-10
Attending: STUDENT IN AN ORGANIZED HEALTH CARE EDUCATION/TRAINING PROGRAM | Admitting: STUDENT IN AN ORGANIZED HEALTH CARE EDUCATION/TRAINING PROGRAM
Payer: MEDICARE

## 2025-04-06 DIAGNOSIS — I50.9 CONGESTIVE HEART FAILURE, UNSPECIFIED HF CHRONICITY, UNSPECIFIED HEART FAILURE TYPE: Primary | ICD-10-CM

## 2025-04-06 DIAGNOSIS — I50.9 NEW ONSET OF CONGESTIVE HEART FAILURE: ICD-10-CM

## 2025-04-06 DIAGNOSIS — R60.0 BILATERAL LOWER EXTREMITY EDEMA: ICD-10-CM

## 2025-04-06 DIAGNOSIS — N17.9 AKI (ACUTE KIDNEY INJURY): ICD-10-CM

## 2025-04-06 DIAGNOSIS — M79.89 LEFT LEG SWELLING: ICD-10-CM

## 2025-04-06 LAB
ABSOLUTE EOSINOPHIL (SMH): 0.16 K/UL
ABSOLUTE MONOCYTE (SMH): 0.38 K/UL (ref 0.3–1)
ABSOLUTE NEUTROPHIL COUNT (SMH): 1.9 K/UL (ref 1.8–7.7)
ALBUMIN SERPL-MCNC: 4.5 G/DL (ref 3.5–5.2)
ALP SERPL-CCNC: 101 UNIT/L (ref 40–150)
ALT SERPL-CCNC: 34 UNIT/L (ref 10–44)
ANION GAP (SMH): 10 MMOL/L (ref 8–16)
AST SERPL-CCNC: 38 UNIT/L (ref 11–45)
BASOPHILS # BLD AUTO: 0.05 K/UL
BASOPHILS NFR BLD AUTO: 1.1 %
BILIRUB SERPL-MCNC: 0.4 MG/DL (ref 0.1–1)
BUN SERPL-MCNC: 21 MG/DL (ref 8–23)
CALCIUM SERPL-MCNC: 9.2 MG/DL (ref 8.7–10.5)
CHLORIDE SERPL-SCNC: 108 MMOL/L (ref 95–110)
CO2 SERPL-SCNC: 25 MMOL/L (ref 23–29)
CREAT SERPL-MCNC: 1.2 MG/DL (ref 0.5–1.4)
ERYTHROCYTE [DISTWIDTH] IN BLOOD BY AUTOMATED COUNT: 14.6 % (ref 11.5–14.5)
GFR SERPLBLD CREATININE-BSD FMLA CKD-EPI: 48 ML/MIN/1.73/M2
GLUCOSE SERPL-MCNC: 88 MG/DL (ref 70–110)
HCT VFR BLD AUTO: 38.8 % (ref 37–48.5)
HCV AB SERPL QL IA: NORMAL
HGB BLD-MCNC: 12.4 GM/DL (ref 12–16)
HIV 1+2 AB+HIV1 P24 AG SERPL QL IA: NORMAL
IMM GRANULOCYTES # BLD AUTO: 0.01 K/UL (ref 0–0.04)
IMM GRANULOCYTES NFR BLD AUTO: 0.2 % (ref 0–0.5)
LYMPHOCYTES # BLD AUTO: 2.16 K/UL (ref 1–4.8)
MCH RBC QN AUTO: 28.4 PG (ref 27–31)
MCHC RBC AUTO-ENTMCNC: 32 G/DL (ref 32–36)
MCV RBC AUTO: 89 FL (ref 82–98)
NT-PROBNP SERPL-MCNC: 1434 PG/ML
NUCLEATED RBC (/100WBC) (SMH): 0 /100 WBC
PLATELET # BLD AUTO: 231 K/UL (ref 150–450)
PMV BLD AUTO: 10.3 FL (ref 9.2–12.9)
POTASSIUM SERPL-SCNC: 3.8 MMOL/L (ref 3.5–5.1)
PROT SERPL-MCNC: 7.6 GM/DL (ref 6–8.4)
RBC # BLD AUTO: 4.37 M/UL (ref 4–5.4)
RELATIVE EOSINOPHIL (SMH): 3.4 % (ref 0–8)
RELATIVE LYMPHOCYTE (SMH): 46 % (ref 18–48)
RELATIVE MONOCYTE (SMH): 8.1 % (ref 4–15)
RELATIVE NEUTROPHIL (SMH): 41.2 % (ref 38–73)
SODIUM SERPL-SCNC: 143 MMOL/L (ref 136–145)
T4 FREE SERPL-MCNC: 1.08 NG/DL (ref 0.71–1.51)
T4 FREE SERPL-MCNC: 1.12 NG/DL (ref 0.71–1.51)
TSH SERPL-ACNC: 0.75 UIU/ML (ref 0.4–4)
WBC # BLD AUTO: 4.7 K/UL (ref 3.9–12.7)

## 2025-04-06 PROCEDURE — G0378 HOSPITAL OBSERVATION PER HR: HCPCS

## 2025-04-06 PROCEDURE — 86803 HEPATITIS C AB TEST: CPT | Performed by: EMERGENCY MEDICINE

## 2025-04-06 PROCEDURE — 25000003 PHARM REV CODE 250

## 2025-04-06 PROCEDURE — 36415 COLL VENOUS BLD VENIPUNCTURE: CPT | Performed by: PHYSICIAN ASSISTANT

## 2025-04-06 PROCEDURE — 93010 ELECTROCARDIOGRAM REPORT: CPT | Mod: ,,, | Performed by: INTERNAL MEDICINE

## 2025-04-06 PROCEDURE — 63600175 PHARM REV CODE 636 W HCPCS

## 2025-04-06 PROCEDURE — 36415 COLL VENOUS BLD VENIPUNCTURE: CPT

## 2025-04-06 PROCEDURE — 80053 COMPREHEN METABOLIC PANEL: CPT | Performed by: PHYSICIAN ASSISTANT

## 2025-04-06 PROCEDURE — 85025 COMPLETE CBC W/AUTO DIFF WBC: CPT | Performed by: PHYSICIAN ASSISTANT

## 2025-04-06 PROCEDURE — 96372 THER/PROPH/DIAG INJ SC/IM: CPT

## 2025-04-06 PROCEDURE — 99900035 HC TECH TIME PER 15 MIN (STAT)

## 2025-04-06 PROCEDURE — 83880 ASSAY OF NATRIURETIC PEPTIDE: CPT | Performed by: PHYSICIAN ASSISTANT

## 2025-04-06 PROCEDURE — 93005 ELECTROCARDIOGRAM TRACING: CPT

## 2025-04-06 PROCEDURE — 84443 ASSAY THYROID STIM HORMONE: CPT

## 2025-04-06 PROCEDURE — 94799 UNLISTED PULMONARY SVC/PX: CPT

## 2025-04-06 PROCEDURE — 99285 EMERGENCY DEPT VISIT HI MDM: CPT | Mod: 25

## 2025-04-06 PROCEDURE — 87389 HIV-1 AG W/HIV-1&-2 AB AG IA: CPT | Performed by: EMERGENCY MEDICINE

## 2025-04-06 RX ORDER — ENOXAPARIN SODIUM 100 MG/ML
40 INJECTION SUBCUTANEOUS EVERY 24 HOURS
Status: DISCONTINUED | OUTPATIENT
Start: 2025-04-06 | End: 2025-04-09

## 2025-04-06 RX ORDER — FUROSEMIDE 10 MG/ML
40 INJECTION INTRAMUSCULAR; INTRAVENOUS DAILY
Status: DISCONTINUED | OUTPATIENT
Start: 2025-04-07 | End: 2025-04-09

## 2025-04-06 RX ORDER — SODIUM,POTASSIUM PHOSPHATES 280-250MG
2 POWDER IN PACKET (EA) ORAL
Status: DISCONTINUED | OUTPATIENT
Start: 2025-04-06 | End: 2025-04-10 | Stop reason: HOSPADM

## 2025-04-06 RX ORDER — LANOLIN ALCOHOL/MO/W.PET/CERES
800 CREAM (GRAM) TOPICAL
Status: DISCONTINUED | OUTPATIENT
Start: 2025-04-06 | End: 2025-04-10 | Stop reason: HOSPADM

## 2025-04-06 RX ORDER — PROCHLORPERAZINE EDISYLATE 5 MG/ML
5 INJECTION INTRAMUSCULAR; INTRAVENOUS EVERY 6 HOURS PRN
Status: DISCONTINUED | OUTPATIENT
Start: 2025-04-06 | End: 2025-04-10 | Stop reason: HOSPADM

## 2025-04-06 RX ORDER — SODIUM CHLORIDE 0.9 % (FLUSH) 0.9 %
10 SYRINGE (ML) INJECTION
Status: DISCONTINUED | OUTPATIENT
Start: 2025-04-06 | End: 2025-04-10 | Stop reason: HOSPADM

## 2025-04-06 RX ORDER — FUROSEMIDE 10 MG/ML
40 INJECTION INTRAMUSCULAR; INTRAVENOUS
Status: COMPLETED | OUTPATIENT
Start: 2025-04-06 | End: 2025-04-06

## 2025-04-06 RX ORDER — ACETAMINOPHEN 325 MG/1
650 TABLET ORAL EVERY 8 HOURS PRN
Status: DISCONTINUED | OUTPATIENT
Start: 2025-04-06 | End: 2025-04-10 | Stop reason: HOSPADM

## 2025-04-06 RX ORDER — CETIRIZINE HYDROCHLORIDE 10 MG/1
1 TABLET ORAL EVERY MORNING
COMMUNITY

## 2025-04-06 RX ORDER — ONDANSETRON HYDROCHLORIDE 2 MG/ML
4 INJECTION, SOLUTION INTRAVENOUS EVERY 8 HOURS PRN
Status: DISCONTINUED | OUTPATIENT
Start: 2025-04-06 | End: 2025-04-10 | Stop reason: HOSPADM

## 2025-04-06 RX ADMIN — FUROSEMIDE 40 MG: 10 INJECTION, SOLUTION INTRAVENOUS at 08:04

## 2025-04-06 RX ADMIN — POTASSIUM BICARBONATE 50 MEQ: 977.5 TABLET, EFFERVESCENT ORAL at 09:04

## 2025-04-06 RX ADMIN — ENOXAPARIN SODIUM 40 MG: 40 INJECTION SUBCUTANEOUS at 09:04

## 2025-04-06 NOTE — FIRST PROVIDER EVALUATION
Emergency Department TeleTriage Encounter Note      CHIEF COMPLAINT    Chief Complaint   Patient presents with    Leg Swelling     bilaterally       VITAL SIGNS   Initial Vitals [04/06/25 1554]   BP Pulse Resp Temp SpO2   (!) 160/76 65 16 97.9 °F (36.6 °C) 98 %      MAP       --            ALLERGIES    Review of patient's allergies indicates:  No Known Allergies    PROVIDER TRIAGE NOTE  Patient presents with bilateral lower extremity swelling. NO shortness of breath or chest pain.       ORDERS  Labs Reviewed   HEPATITIS C ANTIBODY   HIV 1 / 2 ANTIBODY       ED Orders (720h ago, onward)      Start Ordered     Status Ordering Provider    04/06/25 1557 04/06/25 1556  Hepatitis C Antibody  STAT         Ordered NIRAV BANEGAS    04/06/25 1557 04/06/25 1556  HIV 1/2 Ag/Ab (4th Gen)  STAT         Ordered NIRAV BANEGAS              Virtual Visit Note: The provider triage portion of this emergency department evaluation and documentation was performed via [employer], a HIPAA-compliant telemedicine application, in concert with a tele-presenter in the room. A face to face patient evaluation with one of my colleagues will occur once the patient is placed in an emergency department room.      DISCLAIMER: This note was prepared with [employer] voice recognition transcription software. Garbled syntax, mangled pronouns, and other bizarre constructions may be attributed to that software system.

## 2025-04-06 NOTE — ED PROVIDER NOTES
Encounter Date: 2025       History     Chief Complaint   Patient presents with    Leg Swelling     bilaterally     74-year-old female with a past medical history of hypertension, hyperlipidemia, and third-degree AV block status post pacemaker presents to ED for bilateral leg swelling.  Patient states it started 2 weeks ago.  Denies any pain.  Denies any trauma or falls.  Patient also admits to intermittent paresthesias and a left leg bruise.  Denies fever, sweats, chills, shortness breath, chest pain, abdominal pain, nausea, vomiting, diarrhea, constipation, urinary symptoms, dizziness, lightheadedness, numbness.        Review of patient's allergies indicates:  No Known Allergies  Past Medical History:   Diagnosis Date    Hyperlipemia     Hypertension      Past Surgical History:   Procedure Laterality Date     SECTION      INSERTION OF PACEMAKER       No family history on file.  Social History[1]  Review of Systems   Constitutional:  Negative for chills, diaphoresis and fever.   Respiratory:  Negative for shortness of breath.    Cardiovascular:  Positive for leg swelling (Bilateral). Negative for chest pain.   Gastrointestinal:  Negative for abdominal pain, constipation, diarrhea, nausea and vomiting.   Endocrine: Negative for polyuria.   Genitourinary:  Negative for decreased urine volume, difficulty urinating, dysuria, frequency and urgency.   Musculoskeletal:  Negative for myalgias.   Skin:  Positive for color change (Bruise left lower leg).   Neurological:  Negative for dizziness, weakness, light-headedness, numbness and headaches.       Physical Exam     Initial Vitals [25 1554]   BP Pulse Resp Temp SpO2   (!) 160/76 65 16 97.9 °F (36.6 °C) 98 %      MAP       --         Physical Exam    Nursing note and vitals reviewed.  Constitutional: She appears well-developed and well-nourished. She is not diaphoretic. She is active. She does not appear ill. No distress.   HENT:   Head: Normocephalic and  atraumatic.   Right Ear: External ear normal.   Left Ear: External ear normal.   Nose: Nose normal.   Eyes: Conjunctivae, EOM and lids are normal. Pupils are equal, round, and reactive to light. Right eye exhibits no discharge. Left eye exhibits no discharge.   Neck: Phonation normal. Neck supple.   Normal range of motion.   Full passive range of motion without pain.     Cardiovascular:  Normal rate and regular rhythm.           Pulses:       Dorsalis pedis pulses are 2+ on the right side and 2+ on the left side.   Pulmonary/Chest: Effort normal and breath sounds normal. No respiratory distress.   Abdominal: She exhibits no distension.   Musculoskeletal:      Cervical back: Full passive range of motion without pain, normal range of motion and neck supple.      Right knee: Normal.      Left knee: Normal.      Right lower leg: Swelling present. No tenderness or bony tenderness. 2+ Pitting Edema present.      Left lower leg: Swelling present. No tenderness or bony tenderness. 2+ Pitting Edema present.      Right ankle: Swelling present.      Left ankle: Swelling present.      Right foot: Normal range of motion and normal capillary refill. Swelling present. No tenderness, bony tenderness or crepitus. Normal pulse.      Left foot: Normal range of motion and normal capillary refill. Swelling present. No tenderness, bony tenderness or crepitus. Normal pulse.      Comments: 2 cm area of ecchymosis to the left lower anterior proximal leg.  Neurovascularly intact.  No bony tenderness.  No pallor, warmth, or erythema.  No varicose veins appreciated 2+ pitting edema bilaterally.     Neurological: She is alert and oriented to person, place, and time. She has normal strength. No sensory deficit. GCS eye subscore is 4. GCS verbal subscore is 5. GCS motor subscore is 6.   Skin: Skin is dry. Capillary refill takes less than 2 seconds.         ED Course   Procedures  Labs Reviewed   COMPREHENSIVE METABOLIC PANEL - Abnormal        Result Value    Sodium 143      Potassium 3.8      Chloride 108      CO2 25      Glucose 88      BUN 21      Creatinine 1.2      Calcium 9.2      Protein Total 7.6      Albumin 4.5      Bilirubin Total 0.4            AST 38      ALT 34      Anion Gap 10      eGFR 48 (*)    NT-PRO NATRIURETIC PEPTIDE - Abnormal    NT-proBNP 1,434 (*)    CBC WITH DIFFERENTIAL - Abnormal    WBC 4.70      RBC 4.37      Hgb 12.4      Hct 38.8      MCV 89      MCH 28.4      MCHC 32.0      RDW 14.6 (*)     Platelet Count 231      MPV 10.3      Nucleated RBC 0      Neut % 41.2      Lymph % 46.0      Mono % 8.1      Eos % 3.4      Basophil % 1.1      Imm Grans % 0.2      Neut # 1.9      Lymph # 2.16      Mono # 0.38      Eos # 0.16      Baso # 0.05      Imm Grans # 0.01     HEPATITIS C ANTIBODY - Normal    Hep C Ab Interp Non-Reactive     HIV 1 / 2 ANTIBODY - Normal    HIV 1/2 Ag/Ab Non-Reactive     CBC W/ AUTO DIFFERENTIAL    Narrative:     The following orders were created for panel order CBC auto differential.  Procedure                               Abnormality         Status                     ---------                               -----------         ------                     CBC with Differential[[id number]]       Abnormal            Final result                 Please view results for these tests on the individual orders.          Imaging Results              US Lower Extremity Veins Left (Final result)  Result time 04/06/25 18:19:28      Final result by Dangelo Gomez DO (04/06/25 18:19:28)                   Impression:      No evidence of deep venous thrombosis in the left lower extremity.      Electronically signed by: Dangelo Gomez  Date:    04/06/2025  Time:    18:19               Narrative:    EXAMINATION:  US LOWER EXTREMITY VEINS LEFT    CLINICAL HISTORY:  Other specified soft tissue disorders    TECHNIQUE:  Duplex and color flow Doppler evaluation and graded compression of the left lower extremity veins was  performed.    COMPARISON:  None    FINDINGS:  Left thigh veins: The common femoral, femoral, popliteal, upper greater saphenous, and deep femoral veins are patent and free of thrombus. The veins are normally compressible and have normal phasic flow and augmentation response.    Left calf veins: The visualized calf veins are patent.    Contralateral CFV: The contralateral (right) common femoral vein is patent and free of thrombus.    Miscellaneous: None                                       X-Ray Chest AP Portable (Final result)  Result time 04/06/25 16:47:29      Final result by Freddie Ramos MD (04/06/25 16:47:29)                   Impression:      As above.      Electronically signed by: Freddie Ramos  Date:    04/06/2025  Time:    16:47               Narrative:    EXAMINATION:  XR CHEST AP PORTABLE    CLINICAL HISTORY:  Shortness of breath    TECHNIQUE:  Single frontal portable view of the chest was performed.    COMPARISON:  08/07/2020    FINDINGS:  Left chest wall dual lead cardiac pacemaker.  Stable enlarged cardiac silhouette.  Aortic calcification.  Central pulmonary vascular congestion.  No focal airspace consolidation, pleural effusion, or pneumothorax.  No acute bony abnormality identified.                                       Medications   furosemide injection 40 mg (has no administration in time range)   sodium chloride 0.9% flush 10 mL (has no administration in time range)   enoxaparin injection 40 mg (has no administration in time range)   furosemide injection 40 mg (has no administration in time range)   ondansetron injection 4 mg (has no administration in time range)   prochlorperazine injection Soln 5 mg (has no administration in time range)   acetaminophen tablet 650 mg (has no administration in time range)   potassium bicarbonate disintegrating tablet 50 mEq (has no administration in time range)   potassium bicarbonate disintegrating tablet 35 mEq (has no administration in time range)    potassium bicarbonate disintegrating tablet 60 mEq (has no administration in time range)   magnesium oxide tablet 800 mg (has no administration in time range)   magnesium oxide tablet 800 mg (has no administration in time range)   potassium, sodium phosphates 280-160-250 mg packet 2 packet (has no administration in time range)   potassium, sodium phosphates 280-160-250 mg packet 2 packet (has no administration in time range)   potassium, sodium phosphates 280-160-250 mg packet 2 packet (has no administration in time range)     Medical Decision Making  74-year-old female with a past medical history of hypertension, hyperlipidemia, and third-degree AV block status post pacemaker presents to ED for bilateral leg swelling.   Patient's chart and medical history reviewed.    Ddx:  CHF  DVT  Dependent edema    Patient's vitals reviewed.  Afebrile, no respiratory distress, and nontoxic-appearing in the ED. Patient had 2 cm area of ecchymosis to the left lower anterior proximal leg.  Neurovascularly intact.  No bony tenderness.  No pallor, warmth, or erythema.  No varicose veins appreciated 2+ pitting edema bilaterally.  With shared decision-making we will get labs and imaging today. Chest x-ray was unremarkable for acute changes per my personal interpretation. Official x-ray interpretation showed Left chest wall dual lead cardiac pacemaker.  Stable enlarged cardiac silhouette.  Aortic calcification.  Central pulmonary vascular congestion.  No focal airspace consolidation, pleural effusion, or pneumothorax.  No acute bony abnormality identified. EKG showed ventricle paced rhythm with 65 bpm.  ms.  ms. No stemi noted. Signed by Dr. Fall.  CBC and CMP overall unremarkable besides a GFR of 48.  Normal BUN and creatinine.  BNP is 1434.  CHF exacerbation likely.  Left lower extremity ultrasound showed no evidence of DVT.  Discussed this case with Dr. Fall.  Discussed all results with patient including  CHF exacerbation, she verbalized understanding.  Patient given IV Lasix in the ED.  Discussed this case with JOSE ANTONIO [facility] Medicine.  Patient will be admitted for further observation of the CHF exacerbation.  Patient agrees with this plan.      Amount and/or Complexity of Data Reviewed  External Data Reviewed: labs, radiology, ECG and notes.  Labs: ordered.  Radiology: ordered.  ECG/medicine tests: ordered.    Risk  Prescription drug management.              Attending Attestation:     Physician Attestation Statement for NP/PA:   I personally made/approved the management plan and take responsibility for the patient management.    Other NP/PA Attestation Additions:    History of Present Illness: 74-year-old female presents with worsening bilateral lower extremity swelling.  No definitive chest pain or shortness for breath or orthopnea   Physical Exam: Distal pulses 2+, pitting edema bilateral lower extremities, no significant asymmetrical swelling.  No oxygen requirement   Medical Decision Makin-year-old female presents with worsening bilateral lower extremity swelling.  Patient evaluated by physician assistant initially..  No evidence of DVT, elevated BNP, not currently on diuretics.  Through shared decision-making with patient and daughter will plan for admission for IV Lasix and likely repeat echo given previous echo was in .  Physician assistant spoke with hospitalist team who will admit for further management evaluation, patient and daughter updated and agree with plan             ED Course as of 25   Sun 2025   1730 EKG rate 65, , , no STEMI EKG interpreted by myself Desmond Fall DO   [KB]    · There is no evidence of intracardiac shunting.  · Normal left ventricular systolic function. The estimated ejection fraction is 62%.  · Normal LV diastolic function.  · Concentric left ventricular remodeling.  · Normal right ventricular systolic function.  · Mild  right atrial enlargement.  · Mild aortic regurgitation.  · Normal central venous pressure (3 mmHg).  · The estimated PA systolic pressure is 23 mmHg.  · Small pericardial effusion.  · Mild left atrial enlargement.   [KB]   1951 Echo from August 2020 [KB]      ED Course User Index  [KB] Desmond Fall Jr., DO                           Clinical Impression:  Final diagnoses:  [M79.89] Left leg swelling  [R60.0] Bilateral lower extremity edema  [I50.9] Congestive heart failure, unspecified HF chronicity, unspecified heart failure type (Primary)          ED Disposition Condition    Observation Stable                  Holdsworth, Alayna, PA-C  04/06/25 1906       [1]   Social History  Tobacco Use    Smoking status: Never    Smokeless tobacco: Never   Substance Use Topics    Alcohol use: Not Currently     Comment: Very rare    Drug use: Never        Desmond Fall Jr., DO  04/06/25 2005

## 2025-04-07 PROBLEM — Z95.0 PACEMAKER: Status: ACTIVE | Noted: 2025-04-07

## 2025-04-07 PROBLEM — I10 HYPERTENSION: Status: ACTIVE | Noted: 2025-04-07

## 2025-04-07 PROBLEM — E78.5 HYPERLIPIDEMIA: Status: ACTIVE | Noted: 2025-04-07

## 2025-04-07 PROBLEM — I50.9 NEW ONSET OF CONGESTIVE HEART FAILURE: Status: ACTIVE | Noted: 2025-04-07

## 2025-04-07 LAB
ANION GAP (SMH): 11 MMOL/L (ref 8–16)
AORTIC ROOT ANNULUS: 2.45 CM
AORTIC VALVE CUSP SEPERATION: 2.02 CM
APICAL FOUR CHAMBER EJECTION FRACTION: 49 %
APICAL TWO CHAMBER EJECTION FRACTION: 50 %
ASCENDING AORTA: 2.82 CM
AV INDEX (PROSTH): 0.78
AV MEAN GRADIENT: 3 MMHG
AV PEAK GRADIENT: 6 MMHG
AV REGURGITATION PRESSURE HALF TIME: 1301 MS
AV VALVE AREA BY VELOCITY RATIO: 2.6 CM²
AV VALVE AREA: 2.4 CM²
AV VELOCITY RATIO: 0.83
BSA FOR ECHO PROCEDURE: 1.84 M2
BUN SERPL-MCNC: 21 MG/DL (ref 8–23)
CALCIUM SERPL-MCNC: 9.6 MG/DL (ref 8.7–10.5)
CHLORIDE SERPL-SCNC: 104 MMOL/L (ref 95–110)
CHOLEST SERPL-MCNC: 178 MG/DL (ref 120–199)
CHOLEST/HDLC SERPL: 3.7 {RATIO} (ref 2–5)
CO2 SERPL-SCNC: 29 MMOL/L (ref 23–29)
CREAT SERPL-MCNC: 1.3 MG/DL (ref 0.5–1.4)
CV ECHO LV RWT: 0.4 CM
DOP CALC AO PEAK VEL: 1.2 M/S
DOP CALC AO VTI: 20.9 CM
DOP CALC LVOT AREA: 3.1 CM2
DOP CALC LVOT DIAMETER: 2 CM
DOP CALC LVOT PEAK VEL: 1 M/S
DOP CALC LVOT STROKE VOLUME: 51.2 CM3
DOP CALC MV VTI: 17 CM
DOP CALCLVOT PEAK VEL VTI: 16.3 CM
E WAVE DECELERATION TIME: 228 MSEC
E/A RATIO: 0.7
E/E' RATIO: 12 M/S
EAG (SMH): 128 MG/DL (ref 68–131)
ECHO LV POSTERIOR WALL: 0.8 CM (ref 0.6–1.1)
ERYTHROCYTE [DISTWIDTH] IN BLOOD BY AUTOMATED COUNT: 14.6 % (ref 11.5–14.5)
FRACTIONAL SHORTENING: 27.5 % (ref 28–44)
GFR SERPLBLD CREATININE-BSD FMLA CKD-EPI: 44 ML/MIN/1.73/M2
GLUCOSE SERPL-MCNC: 98 MG/DL (ref 70–110)
HBA1C MFR BLD: 6.1 % (ref 4–5.6)
HCT VFR BLD AUTO: 39.4 % (ref 37–48.5)
HDLC SERPL-MCNC: 48 MG/DL (ref 40–75)
HDLC SERPL: 27 % (ref 20–50)
HGB BLD-MCNC: 12.4 GM/DL (ref 12–16)
INTERVENTRICULAR SEPTUM: 0.8 CM (ref 0.6–1.1)
IVRT: 114 MSEC
LA MAJOR: 5.1 CM
LDLC SERPL CALC-MCNC: 113.4 MG/DL (ref 63–159)
LEFT ATRIUM AREA SYSTOLIC (APICAL 2 CHAMBER): 14.96 CM2
LEFT ATRIUM AREA SYSTOLIC (APICAL 4 CHAMBER): 17.97 CM2
LEFT ATRIUM VOLUME INDEX MOD: 24 ML/M2
LEFT ATRIUM VOLUME MOD: 44 ML
LEFT INTERNAL DIMENSION IN SYSTOLE: 2.9 CM (ref 2.1–4)
LEFT VENTRICLE DIASTOLIC VOLUME INDEX: 38.67 ML/M2
LEFT VENTRICLE DIASTOLIC VOLUME: 70 ML
LEFT VENTRICLE END DIASTOLIC VOLUME APICAL 2 CHAMBER: 65.24 ML
LEFT VENTRICLE END DIASTOLIC VOLUME APICAL 4 CHAMBER: 55.58 ML
LEFT VENTRICLE END SYSTOLIC VOLUME APICAL 2 CHAMBER: 36.17 ML
LEFT VENTRICLE END SYSTOLIC VOLUME APICAL 4 CHAMBER: 48.77 ML
LEFT VENTRICLE MASS INDEX: 51.6 G/M2
LEFT VENTRICLE SYSTOLIC VOLUME INDEX: 18.2 ML/M2
LEFT VENTRICLE SYSTOLIC VOLUME: 33 ML
LEFT VENTRICULAR INTERNAL DIMENSION IN DIASTOLE: 4 CM (ref 3.5–6)
LEFT VENTRICULAR MASS: 93.5 G
LV LATERAL E/E' RATIO: 10.3 M/S
LV SEPTAL E/E' RATIO: 15.5 M/S
LVED V (TEICH): 70.07 ML
LVES V (TEICH): 33.1 ML
LVOT MG: 1.97 MMHG
LVOT MV: 0.65 CM/S
MAGNESIUM SERPL-MCNC: 2.2 MG/DL (ref 1.6–2.6)
MCH RBC QN AUTO: 27.2 PG (ref 27–31)
MCHC RBC AUTO-ENTMCNC: 31.5 G/DL (ref 32–36)
MCV RBC AUTO: 86 FL (ref 82–98)
MV A" WAVE DURATION": 137.01 MSEC
MV MEAN GRADIENT: 1 MMHG
MV PEAK A VEL: 0.88 M/S
MV PEAK E VEL: 0.62 M/S
MV PEAK GRADIENT: 3 MMHG
MV STENOSIS PRESSURE HALF TIME: 52.22 MS
MV VALVE AREA BY CONTINUITY EQUATION: 3.01 CM2
MV VALVE AREA P 1/2 METHOD: 4.21 CM2
NONHDLC SERPL-MCNC: 130 MG/DL
OHS CV RV/LV RATIO: 0.75 CM
OHS LV EJECTION FRACTION SIMPSONS BIPLANE MOD: 50 %
OHS QRS DURATION: 186 MS
OHS QTC CALCULATION: 497 MS
PISA AR MAX VEL: 3.76 M/S
PISA MRMAX VEL: 4.49 M/S
PISA TR MAX VEL: 2.5 M/S
PLATELET # BLD AUTO: 244 K/UL (ref 150–450)
PMV BLD AUTO: 10.5 FL (ref 9.2–12.9)
POTASSIUM SERPL-SCNC: 4.6 MMOL/L (ref 3.5–5.1)
PV MV: 0.52 M/S
PV PEAK GRADIENT: 1 MMHG
PV PEAK VELOCITY: 0.61 M/S
RA MAJOR: 3.83 CM
RA PRESSURE ESTIMATED: 3 MMHG
RBC # BLD AUTO: 4.56 M/UL (ref 4–5.4)
RIGHT VENTRICLE DIASTOLIC BASEL DIMENSION: 3 CM
RIGHT VENTRICLE DIASTOLIC LENGTH: 5.8 CM
RIGHT VENTRICULAR END-DIASTOLIC DIMENSION: 2.97 CM
RIGHT VENTRICULAR LENGTH IN DIASTOLE (APICAL 4-CHAMBER VIEW): 5.81 CM
RV TB RVSP: 6 MMHG
RV TISSUE DOPPLER FREE WALL SYSTOLIC VELOCITY 1 (APICAL 4 CHAMBER VIEW): 12.81 CM/S
SODIUM SERPL-SCNC: 144 MMOL/L (ref 136–145)
STJ: 2.51 CM
TDI LATERAL: 0.06 M/S
TDI SEPTAL: 0.04 M/S
TDI: 0.05 M/S
TR MAX PG: 24 MMHG
TRICUSPID ANNULAR PLANE SYSTOLIC EXCURSION: 2.09 CM
TRIGL SERPL-MCNC: 83 MG/DL (ref 30–150)
TV REST PULMONARY ARTERY PRESSURE: 28 MMHG
WBC # BLD AUTO: 4.25 K/UL (ref 3.9–12.7)
Z-SCORE OF LEFT VENTRICULAR DIMENSION IN END DIASTOLE: -2.25
Z-SCORE OF LEFT VENTRICULAR DIMENSION IN END SYSTOLE: -0.51

## 2025-04-07 PROCEDURE — 94761 N-INVAS EAR/PLS OXIMETRY MLT: CPT

## 2025-04-07 PROCEDURE — 11000001 HC ACUTE MED/SURG PRIVATE ROOM

## 2025-04-07 PROCEDURE — 80061 LIPID PANEL: CPT

## 2025-04-07 PROCEDURE — 63600175 PHARM REV CODE 636 W HCPCS

## 2025-04-07 PROCEDURE — 83735 ASSAY OF MAGNESIUM: CPT

## 2025-04-07 PROCEDURE — 99900031 HC PATIENT EDUCATION (STAT)

## 2025-04-07 PROCEDURE — 85027 COMPLETE CBC AUTOMATED: CPT

## 2025-04-07 PROCEDURE — 36415 COLL VENOUS BLD VENIPUNCTURE: CPT

## 2025-04-07 PROCEDURE — 25000003 PHARM REV CODE 250

## 2025-04-07 PROCEDURE — 83036 HEMOGLOBIN GLYCOSYLATED A1C: CPT

## 2025-04-07 PROCEDURE — 80048 BASIC METABOLIC PNL TOTAL CA: CPT

## 2025-04-07 RX ORDER — NAPROXEN SODIUM 220 MG/1
81 TABLET, FILM COATED ORAL DAILY
Status: DISCONTINUED | OUTPATIENT
Start: 2025-04-07 | End: 2025-04-10 | Stop reason: HOSPADM

## 2025-04-07 RX ORDER — LISINOPRIL 10 MG/1
20 TABLET ORAL DAILY
Status: DISCONTINUED | OUTPATIENT
Start: 2025-04-07 | End: 2025-04-09

## 2025-04-07 RX ORDER — ATORVASTATIN CALCIUM 10 MG/1
10 TABLET, FILM COATED ORAL DAILY
Status: DISCONTINUED | OUTPATIENT
Start: 2025-04-07 | End: 2025-04-10 | Stop reason: HOSPADM

## 2025-04-07 RX ORDER — HYDROCHLOROTHIAZIDE 12.5 MG/1
12.5 TABLET ORAL DAILY
Status: DISCONTINUED | OUTPATIENT
Start: 2025-04-07 | End: 2025-04-09

## 2025-04-07 RX ORDER — CETIRIZINE HYDROCHLORIDE 10 MG/1
10 TABLET ORAL EVERY MORNING
Status: DISCONTINUED | OUTPATIENT
Start: 2025-04-07 | End: 2025-04-10 | Stop reason: HOSPADM

## 2025-04-07 RX ADMIN — CETIRIZINE HYDROCHLORIDE 10 MG: 10 TABLET, FILM COATED ORAL at 10:04

## 2025-04-07 RX ADMIN — ATORVASTATIN CALCIUM 10 MG: 10 TABLET, FILM COATED ORAL at 10:04

## 2025-04-07 RX ADMIN — ASPIRIN 81 MG CHEWABLE TABLET 81 MG: 81 TABLET CHEWABLE at 10:04

## 2025-04-07 RX ADMIN — ACETAMINOPHEN 650 MG: 325 TABLET ORAL at 12:04

## 2025-04-07 RX ADMIN — LISINOPRIL 20 MG: 10 TABLET ORAL at 10:04

## 2025-04-07 RX ADMIN — ENOXAPARIN SODIUM 40 MG: 40 INJECTION SUBCUTANEOUS at 05:04

## 2025-04-07 RX ADMIN — HYDROCHLOROTHIAZIDE 12.5 MG: 12.5 TABLET ORAL at 10:04

## 2025-04-07 RX ADMIN — FUROSEMIDE 40 MG: 10 INJECTION, SOLUTION INTRAVENOUS at 08:04

## 2025-04-07 NOTE — HPI
"Alayna Bergman is a 74 year old female with a previous medical history of Hypertension, Hyperlipidemia, Supraventricular arrythmia 3rd degree heart block with pacemaker in place who presented to the ED for leg swelling. Patient reports increased leg swelling for about two weeks but significantly worse over one week. She denies any shortness of breath or increased pillow usage at night to sleep. Reports she sleeps propped up but could lay flat with no issue to sleep. Patient is followed by Dr. Collier in [facility] and last note from 10/10/24 in care everywhere. In this note it reports that she is on HCTZ but then it is not listed in her med list. Patient reports she does not take a daily diuretic and the only time she ever took a "fluid pill" was when they prescribed her three days of furosemide after visiting urgent care for leg swelling related to taking PO minoxidil. Initial ED labs showed a BNP of 1,434 and chest xray showed central pulmonary vascular congestion. CBC normal. CMP showed creatinine of 1.2 with GFR of 48. TSH normal. EKG shows a ventricular paced rhythm. Last Echo from 2020 showed EF of 62%. Patient does report some dietary indiscretion and increased salty foods over the last two weeks. Venous US of left lower extremity negative for DVT. Patient administered 40mg of lasix in ED and admitted by hospital medicine for further evaluation and management.   "

## 2025-04-07 NOTE — PLAN OF CARE
Alayna Bergman has warranted treatment spanning two or more midnights of hospital level care for the management of  New onset CHF . She continues to require IV diuresis, daily labs, supplemental oxygen, further testing/imaging, and monitoring of vital signs. Her condition is also complicated by the following comorbidities:  Hypertension, Hyperlipidemia, Supraventricular arrythmia 3rd degree heart block with pacemaker .    Problem: Patient Care Overview  Goal: Plan of Care Review  Outcome: Ongoing (interventions implemented as appropriate)      Problem: Mobility, Physical Impaired (Adult)  Goal: Enhanced Mobility Skills  Outcome: Ongoing (interventions implemented as appropriate)    Goal: Enhanced Functional Ability  Outcome: Ongoing (interventions implemented as appropriate)      Problem: Hemodialysis (Adult)  Goal: Signs and Symptoms of Listed Potential Problems Will be Absent, Minimized or Managed (Hemodialysis)  Outcome: Ongoing (interventions implemented as appropriate)      Problem: Wound (Includes Pressure Injury) (Adult)  Goal: Signs and Symptoms of Listed Potential Problems Will be Absent, Minimized or Managed (Wound)  Outcome: Ongoing (interventions implemented as appropriate)      Problem: Palliative Care (Adult)  Goal: Identify Related Risk Factors and Signs and Symptoms  Outcome: Ongoing (interventions implemented as appropriate)    Goal: Maximized Comfort  Outcome: Ongoing (interventions implemented as appropriate)    Goal: Enhanced Quality of Life  Outcome: Ongoing (interventions implemented as appropriate)

## 2025-04-07 NOTE — PLAN OF CARE
[facility] - Med/Surg  Initial Discharge Assessment       Primary Care Provider: Todd Noel MD    Admission Diagnosis: Congestive heart failure, unspecified HF chronicity, unspecified heart failure type [I50.9]    Admission Date: 4/6/2025  Expected Discharge Date: 4/8/2025    Transition of Care Barriers: None    Payor: MEDICARE / Plan: MEDICARE PART A & B / Product Type: Government /     Extended Emergency Contact Information  Primary Emergency Contact: Lana Bergman  Mobile Phone: [phone]  Relation: Daughter  Preferred language: English   needed? No    Discharge Plan A: Home with family  Discharge Plan B: Home      [facility] [address] [facility] [place] - [address]  [place] [address]  Phone: [phone] Fax: [phone]    DC assessment completed at bedside with pt, information on facesheet verified. Lives in [place], [place] alone but frequently visits [place] to visit family. PCP is Dr. Noel. Pharm is [facility] in [place]. Denies blood thinners/hh/hd/dme, independent, drives self. Insurance verified. Daughter to provide transport home. Denies POA. NOK is 3 daughters. Denies recent admission. Planning to DC home when clear.      Initial Assessment (most recent)       Adult Discharge Assessment - 04/07/25 0939          Discharge Assessment    Assessment Type Discharge Planning Assessment     Confirmed/corrected address, phone number and insurance Yes     Confirmed Demographics Correct on Facesheet     Source of Information patient     Does patient/caregiver understand observation status Yes     Communicated WILLOW with patient/caregiver Yes     People in Home alone     Facility Arrived From: daughter's house     Do you expect to return to your current living situation? Yes     Do you have help at home or someone to help you manage your care at home? Yes     Who are your caregiver(s) and their phone number(s)? daughters     Prior to hospitilization  cognitive status: Alert/Oriented     Current cognitive status: Alert/Oriented     Equipment Currently Used at Home none     Readmission within 30 days? No     Patient currently being followed by outpatient case management? No     Do you currently have service(s) that help you manage your care at home? No     Do you take prescription medications? Yes     Do you have prescription coverage? Yes     Coverage BCBS     Do you have any problems affording any of your prescribed medications? No     Is the patient taking medications as prescribed? yes     Who is going to help you get home at discharge? daughter     How do you get to doctors appointments? car, drives self     Are you on dialysis? No     Do you take coumadin? No     Discharge Plan A Home with family     Discharge Plan B Home     DME Needed Upon Discharge  none     Discharge Plan discussed with: Patient     Transition of Care Barriers None

## 2025-04-07 NOTE — ASSESSMENT & PLAN NOTE
Patient has new onset heart failure that is Acute. On presentation their CHF was decompensated. Evidence of decompensated CHF on presentation includes: edema. The etiology of their decompensation is likely dietary indiscretion and medication non-compliance. Most recent BNP and echo results are listed below.  Recent Labs     04/06/25  1716   BNP 1,434*     Latest ECHO  Results for orders placed during the hospital encounter of 08/07/20    Echo Color Flow Doppler? Yes; Bubble Contrast? Yes    Interpretation Summary  · There is no evidence of intracardiac shunting.  · Normal left ventricular systolic function. The estimated ejection fraction is 62%.  · Normal LV diastolic function.  · Concentric left ventricular remodeling.  · Normal right ventricular systolic function.  · Mild right atrial enlargement.  · Mild aortic regurgitation.  · Normal central venous pressure (3 mmHg).  · The estimated PA systolic pressure is 23 mmHg.  · Small pericardial effusion.  · Mild left atrial enlargement.    Current Heart Failure Medications  furosemide injection 40 mg, Daily, Intravenous  lisinopriL tablet 20 mg, Daily, Oral  hydroCHLOROthiazide tablet 12.5 mg, Daily, Oral    Plan  - Monitor strict I&Os and daily weights.    - Place on telemetry  - Low sodium diet  - Place on fluid restriction of 1.5 L.   - Cardiology has not been consulted  - The patient's volume status is stable but not at their baseline as indicated by edema  - ECHO in AM

## 2025-04-07 NOTE — ASSESSMENT & PLAN NOTE
Patient is chronically on statin.will continue for now. Monitor clinically. Last LDL was   Lab Results   Component Value Date    LDLCALC 63.0 08/08/2020

## 2025-04-07 NOTE — ASSESSMENT & PLAN NOTE
Patient's blood pressure range in the last 24 hours was: BP  Min: 133/63  Max: 160/76.The patient's inpatient anti-hypertensive regimen is listed below:  Current Antihypertensives  furosemide injection 40 mg, Daily, Intravenous    Plan  Currently normotensive. Home medications not reordered at this time

## 2025-04-07 NOTE — PROGRESS NOTES
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 Patient reports increased leg swelling for about two weeks but significantly worse over one week. She denies any shortness of breath or increased pillow usage at night to sleep. Reports she sleeps propped up but could lay flat with no issue to sleep. Patient is followed by Dr. Collier in [facility] and last note from 10/10/24 in care everywhere. In this note it reports that she is on HCTZ but then it is not listed in her med list. Patient reports she does not take a daily diuretic and the only time she ever took a "fluid pill" was when they prescribed her three days of furosemide after visiting urgent care for leg swelling related to taking PO minoxidil. Initial ED labs showed a BNP of 1,434 and chest xray showed central pulmonary vascular congestion. CBC normal. CMP showed creatinine of 1.2 with GFR of 48. TSH normal. EKG shows a ventricular paced rhythm. Last Echo from 2020 showed EF of 62%. Patient does report some dietary indiscretion and increased salty foods over the last two weeks. Venous US of left lower extremity negative for DVT. Patient administered 40mg of lasix in ED and admitted by hospital medicine for further evaluation and management.     Overview/Hospital Course:  Alayna Bergman was closely monitored while in the hospital.  Patient was admitted to Hospital Medicine for new onset of congestive heart failure.  " Patient was started on IV Lasix 40 mg daily.  Echocardiogram pending.  BNP on admission was 1400.  Patient endorses she had recently had a increase in salt intake after attending multiple fish rods and crawfish balls.  Registered dietitian consulted for education on low-sodium diet.  Patient is prescribed hydrochlorothiazide 12.5 combination with lisinopril, that she states that she has not been taking.  Patient is unsure why she has not feel this medication.  Patient thoroughly educated at the bedside on the need to decrease salt intake and to take all medications as prescribed.  Medication resumed during hospitalization.  Ultrasound BLE negative for DVT.    Interval History:   Patient seen and examined.  NAD.  Patient and family educated on treatment plan.  Patient had good urine output overnight.   HCTZ initiated.  Review of Systems   Respiratory:  Negative for shortness of breath.    Cardiovascular:  Positive for leg swelling. Negative for chest pain and palpitations.     Objective:     Vital Signs (Most Recent):  Temp: 98 °F (36.7 °C) (04/07/25 1059)  Pulse: 65 (04/07/25 1059)  Resp: 17 (04/07/25 1059)  BP: 113/66 (04/07/25 1059)  SpO2: 98 % (04/07/25 1059) Vital Signs (24h Range):  Temp:  [97.6 °F (36.4 °C)-98 °F (36.7 °C)] 98 °F (36.7 °C)  Pulse:  [64-67] 65  Resp:  [16-20] 17  SpO2:  [95 %-98 %] 98 %  BP: (113-160)/(62-76) 113/66     Weight: 73.8 kg (162 lb 11.2 oz)  Body mass index is 27.07 kg/m².    Intake/Output Summary (Last 24 hours) at 4/7/2025 1107  Last data filed at 4/7/2025 1057  Gross per 24 hour   Intake 360 ml   Output 4050 ml   Net -3690 ml         Physical Exam  Vitals and nursing note reviewed.   Constitutional:       General: She is not in acute distress.     Appearance: She is well-developed. She is not ill-appearing.   HENT:      Head: Normocephalic and atraumatic.   Neck:      Thyroid: No thyromegaly.      Vascular: No JVD.   Cardiovascular:      Rate and Rhythm: Normal rate and regular  rhythm.      Heart sounds: No murmur heard.     No friction rub. No gallop.   Pulmonary:      Effort: Pulmonary effort is normal.      Breath sounds: Normal breath sounds.   Abdominal:      General: Bowel sounds are normal. There is no distension.      Palpations: Abdomen is soft. There is no mass.      Tenderness: There is no abdominal tenderness.   Musculoskeletal:         General: Normal range of motion.      Cervical back: Neck supple.      Right lower leg: Edema present.      Left lower leg: Edema present.   Skin:     General: Skin is warm and dry.   Neurological:      Mental Status: She is alert and oriented to person, place, and time.      Cranial Nerves: No cranial nerve deficit.   Psychiatric:         Behavior: Behavior normal.           Significant Labs: All pertinent labs within the past 24 hours have been reviewed.  A1C:   Recent Labs   Lab 04/07/25  0355   HGBA1C 6.1*     CBC:   Recent Labs   Lab 04/06/25 1716 04/07/25 0355   WBC 4.70 4.25   HGB 12.4 12.4   HCT 38.8 39.4    244     CMP:   Recent Labs   Lab 04/06/25 1716 04/07/25  0355    144   K 3.8 4.6   CO2 25 29   BUN 21 21   CREATININE 1.2 1.3   CALCIUM 9.2 9.6   ALBUMIN 4.5  --    BILITOT 0.4  --    ALKPHOS 101  --    AST 38  --    ALT 34  --      Cardiac Markers:   Recent Labs   Lab 04/06/25 1716   BNP 1,434*     Lipid Panel:   Recent Labs   Lab 04/07/25 0355   CHOL 178   HDL 48   TRIG 83       TSH:   Recent Labs   Lab 04/06/25 2009   TSH 0.749     Echo pending    Significant Imaging: I have reviewed all pertinent imaging results/findings within the past 24 hours.  Imaging Results              US Lower Extremity Veins Left (Final result)  Result time 04/06/25 18:19:28      Final result by Dangelo Gomez DO (04/06/25 18:19:28)                   Impression:      No evidence of deep venous thrombosis in the left lower extremity.      Electronically signed by: Dnagelo Gomez  Date:    04/06/2025  Time:    18:19                Narrative:    EXAMINATION:  US LOWER EXTREMITY VEINS LEFT    CLINICAL HISTORY:  Other specified soft tissue disorders    TECHNIQUE:  Duplex and color flow Doppler evaluation and graded compression of the left lower extremity veins was performed.    COMPARISON:  None    FINDINGS:  Left thigh veins: The common femoral, femoral, popliteal, upper greater saphenous, and deep femoral veins are patent and free of thrombus. The veins are normally compressible and have normal phasic flow and augmentation response.    Left calf veins: The visualized calf veins are patent.    Contralateral CFV: The contralateral (right) common femoral vein is patent and free of thrombus.    Miscellaneous: None                                       X-Ray Chest AP Portable (Final result)  Result time 04/06/25 16:47:29      Final result by Freddie Ramos MD (04/06/25 16:47:29)                   Impression:      As above.      Electronically signed by: Freddie Ramos  Date:    04/06/2025  Time:    16:47               Narrative:    EXAMINATION:  XR CHEST AP PORTABLE    CLINICAL HISTORY:  Shortness of breath    TECHNIQUE:  Single frontal portable view of the chest was performed.    COMPARISON:  08/07/2020    FINDINGS:  Left chest wall dual lead cardiac pacemaker.  Stable enlarged cardiac silhouette.  Aortic calcification.  Central pulmonary vascular congestion.  No focal airspace consolidation, pleural effusion, or pneumothorax.  No acute bony abnormality identified.                                          Assessment & Plan  New onset of congestive heart failure  Patient has  new onset  heart failure that is Acute. On presentation their CHF was decompensated. Evidence of decompensated CHF on presentation includes: edema. The etiology of their decompensation is likely dietary indiscretion and medication non-compliance. Most recent BNP and echo results are listed below.  Recent Labs     04/06/25  1716   BNP 1,434*     Latest ECHO  Results for  orders placed during the hospital encounter of 08/07/20    Echo Color Flow Doppler? Yes; Bubble Contrast? Yes    Interpretation Summary  · There is no evidence of intracardiac shunting.  · Normal left ventricular systolic function. The estimated ejection fraction is 62%.  · Normal LV diastolic function.  · Concentric left ventricular remodeling.  · Normal right ventricular systolic function.  · Mild right atrial enlargement.  · Mild aortic regurgitation.  · Normal central venous pressure (3 mmHg).  · The estimated PA systolic pressure is 23 mmHg.  · Small pericardial effusion.  · Mild left atrial enlargement.    Current Heart Failure Medications  furosemide injection 40 mg, Daily, Intravenous  lisinopriL tablet 20 mg, Daily, Oral  hydroCHLOROthiazide tablet 12.5 mg, Daily, Oral    Plan  - Monitor strict I&Os and daily weights.    - Place on telemetry  - Low sodium diet  - Place on fluid restriction of 1.5 L.   - Cardiology has not been consulted  - The patient's volume status is stable but not at their baseline as indicated by edema  - ECHO in AM      Pacemaker  Ventricular paced rhythm  -Cardiac monitoring    Hypertension  Patient's blood pressure range in the last 24 hours was: BP  Min: 113/66  Max: 160/76.The patient's inpatient anti-hypertensive regimen is listed below:  Current Antihypertensives  furosemide injection 40 mg, Daily, Intravenous  lisinopriL tablet 20 mg, Daily, Oral  hydroCHLOROthiazide tablet 12.5 mg, Daily, Oral    Plan  Currently normotensive. Home medications resumed at this time  Hyperlipidemia   Patient is chronically on statin.will continue for now. Monitor clinically. Last LDL was   Lab Results   Component Value Date    LDLCALC 63.0 08/08/2020          VTE Risk Mitigation (From admission, onward)           Ordered     enoxaparin injection 40 mg  Daily         04/06/25 2005     IP VTE HIGH RISK PATIENT  Once         04/06/25 2005     Place sequential compression device  Until discontinued          04/06/25 2005                    Discharge Planning   WILLOW: 4/8/2025     Code Status: Full Code   Medical Readiness for Discharge Date:                            Ekta Yoo NP  Department of Hospital Medicine   [facility]/Surg

## 2025-04-07 NOTE — ASSESSMENT & PLAN NOTE
Patient's blood pressure range in the last 24 hours was: BP  Min: 113/66  Max: 160/76.The patient's inpatient anti-hypertensive regimen is listed below:  Current Antihypertensives  furosemide injection 40 mg, Daily, Intravenous  lisinopriL tablet 20 mg, Daily, Oral  hydroCHLOROthiazide tablet 12.5 mg, Daily, Oral    Plan  Currently normotensive. Home medications resumed at this time

## 2025-04-07 NOTE — ED NOTES
Telebox applied to pt.  Monitor room notified.  Telebox #[phone].  Paced Rhythm 65 per YEFRI Blackmon

## 2025-04-07 NOTE — SUBJECTIVE & OBJECTIVE
Interval History:   Patient seen and examined.  NAD.  Patient and family educated on treatment plan.  Patient had good urine output overnight.   HCTZ initiated.  Review of Systems   Respiratory:  Negative for shortness of breath.    Cardiovascular:  Positive for leg swelling. Negative for chest pain and palpitations.     Objective:     Vital Signs (Most Recent):  Temp: 98 °F (36.7 °C) (04/07/25 1059)  Pulse: 65 (04/07/25 1059)  Resp: 17 (04/07/25 1059)  BP: 113/66 (04/07/25 1059)  SpO2: 98 % (04/07/25 1059) Vital Signs (24h Range):  Temp:  [97.6 °F (36.4 °C)-98 °F (36.7 °C)] 98 °F (36.7 °C)  Pulse:  [64-67] 65  Resp:  [16-20] 17  SpO2:  [95 %-98 %] 98 %  BP: (113-160)/(62-76) 113/66     Weight: 73.8 kg (162 lb 11.2 oz)  Body mass index is 27.07 kg/m².    Intake/Output Summary (Last 24 hours) at 4/7/2025 1107  Last data filed at 4/7/2025 1057  Gross per 24 hour   Intake 360 ml   Output 4050 ml   Net -3690 ml         Physical Exam  Vitals and nursing note reviewed.   Constitutional:       General: She is not in acute distress.     Appearance: She is well-developed. She is not ill-appearing.   HENT:      Head: Normocephalic and atraumatic.   Neck:      Thyroid: No thyromegaly.      Vascular: No JVD.   Cardiovascular:      Rate and Rhythm: Normal rate and regular rhythm.      Heart sounds: No murmur heard.     No friction rub. No gallop.   Pulmonary:      Effort: Pulmonary effort is normal.      Breath sounds: Normal breath sounds.   Abdominal:      General: Bowel sounds are normal. There is no distension.      Palpations: Abdomen is soft. There is no mass.      Tenderness: There is no abdominal tenderness.   Musculoskeletal:         General: Normal range of motion.      Cervical back: Neck supple.      Right lower leg: Edema present.      Left lower leg: Edema present.   Skin:     General: Skin is warm and dry.   Neurological:      Mental Status: She is alert and oriented to person, place, and time.      Cranial  Nerves: No cranial nerve deficit.   Psychiatric:         Behavior: Behavior normal.           Significant Labs: All pertinent labs within the past 24 hours have been reviewed.  A1C:   Recent Labs   Lab 04/07/25 0355   HGBA1C 6.1*     CBC:   Recent Labs   Lab 04/06/25 1716 04/07/25 0355   WBC 4.70 4.25   HGB 12.4 12.4   HCT 38.8 39.4    244     CMP:   Recent Labs   Lab 04/06/25 1716 04/07/25 0355    144   K 3.8 4.6   CO2 25 29   BUN 21 21   CREATININE 1.2 1.3   CALCIUM 9.2 9.6   ALBUMIN 4.5  --    BILITOT 0.4  --    ALKPHOS 101  --    AST 38  --    ALT 34  --      Cardiac Markers:   Recent Labs   Lab 04/06/25 1716   BNP 1,434*     Lipid Panel:   Recent Labs   Lab 04/07/25 0355   CHOL 178   HDL 48   TRIG 83       TSH:   Recent Labs   Lab 04/06/25 2009   TSH 0.749     Echo pending    Significant Imaging: I have reviewed all pertinent imaging results/findings within the past 24 hours.  Imaging Results              US Lower Extremity Veins Left (Final result)  Result time 04/06/25 18:19:28      Final result by Dangelo Gomez DO (04/06/25 18:19:28)                   Impression:      No evidence of deep venous thrombosis in the left lower extremity.      Electronically signed by: Dangelo Gomez  Date:    04/06/2025  Time:    18:19               Narrative:    EXAMINATION:  US LOWER EXTREMITY VEINS LEFT    CLINICAL HISTORY:  Other specified soft tissue disorders    TECHNIQUE:  Duplex and color flow Doppler evaluation and graded compression of the left lower extremity veins was performed.    COMPARISON:  None    FINDINGS:  Left thigh veins: The common femoral, femoral, popliteal, upper greater saphenous, and deep femoral veins are patent and free of thrombus. The veins are normally compressible and have normal phasic flow and augmentation response.    Left calf veins: The visualized calf veins are patent.    Contralateral CFV: The contralateral (right) common femoral vein is patent and free of  thrombus.    Miscellaneous: None                                       X-Ray Chest AP Portable (Final result)  Result time 04/06/25 16:47:29      Final result by Freddie Ramos MD (04/06/25 16:47:29)                   Impression:      As above.      Electronically signed by: Freddie Ramos  Date:    04/06/2025  Time:    16:47               Narrative:    EXAMINATION:  XR CHEST AP PORTABLE    CLINICAL HISTORY:  Shortness of breath    TECHNIQUE:  Single frontal portable view of the chest was performed.    COMPARISON:  08/07/2020    FINDINGS:  Left chest wall dual lead cardiac pacemaker.  Stable enlarged cardiac silhouette.  Aortic calcification.  Central pulmonary vascular congestion.  No focal airspace consolidation, pleural effusion, or pneumothorax.  No acute bony abnormality identified.

## 2025-04-07 NOTE — ASSESSMENT & PLAN NOTE
Patient has new onset heart failure that is Acute. On presentation their CHF was decompensated. Evidence of decompensated CHF on presentation includes: edema. The etiology of their decompensation is likely dietary indiscretion and medication non-compliance. Most recent BNP and echo results are listed below.  Recent Labs     04/06/25  1716   BNP 1,434*     Latest ECHO  Results for orders placed during the hospital encounter of 08/07/20    Echo Color Flow Doppler? Yes; Bubble Contrast? Yes    Interpretation Summary  · There is no evidence of intracardiac shunting.  · Normal left ventricular systolic function. The estimated ejection fraction is 62%.  · Normal LV diastolic function.  · Concentric left ventricular remodeling.  · Normal right ventricular systolic function.  · Mild right atrial enlargement.  · Mild aortic regurgitation.  · Normal central venous pressure (3 mmHg).  · The estimated PA systolic pressure is 23 mmHg.  · Small pericardial effusion.  · Mild left atrial enlargement.    Current Heart Failure Medications  furosemide injection 40 mg, Daily, Intravenous    Plan  - Monitor strict I&Os and daily weights.    - Place on telemetry  - Low sodium diet  - Place on fluid restriction of 1.5 L.   - Cardiology has not been consulted  - The patient's volume status is stable but not at their baseline as indicated by edema  - ECHO in AM

## 2025-04-07 NOTE — CONSULTS
"[facility]/Surg  Adult Nutrition   Consult Note (Initial Assessment)     SUMMARY     Recommendations  1. Recommend adding cardiac restriction to diet.   2. Provided handout and verbal eduction of fluid and salt restricted diet.      Nutrition Goals:   1. PO intake will meet >75% of estimated needs by RD follow up.   2. Pt will verbalize understanding to heart healthy diet prior to discharge.    Nutrition Goal Status: new    Nutrition Interventions: Cardiac diet    Nutrition Diagnosis PES Statement: Food- and nutrition-related knowledge deficit related to use for new education as evidenced by new onset of CHF and pt verbalizing of no recent education on heart healthy diet and low sodium and fluid restricted diet.     Nutrition Diagnosis Status:   New    Dietitian Rounds Brief  RD consulted for education for fluid and salt restriction diet for new onset CHF. Pt reports of having a good appetite at the hospital and at home. Pt stated she ate lots of crawfish this weekend, which caused her to gain fluid. Pt does not follow a particular diet at home.     Nutrition Related Social Determinants of Health: SDOH: Adequate food in home environment    Malnutrition Assessment       No malnutrition to report.                                Diet order:   Current Diet Order: Diet low Sodium, fluid 1500 mL restriction                 Evaluation of Received Nutrient/Fluid Intake  Energy Calories Required: meeting needs  Protein Required: meeting needs  Fluid Required: meeting needs  Tolerance: tolerating     % Intake of Estimated Energy Needs: 75 - 100 %  % Meal Intake: 75 - 100 %      Intake/Output Summary (Last 24 hours) at 4/7/2025 1612  Last data filed at 4/7/2025 1200  Gross per 24 hour   Intake 600 ml   Output 4250 ml   Net -3650 ml        Anthropometrics  Height: 5' 5" (165.1 cm)  Height (inches): 65 in  Height Method: Stated  Weight: 73.5 kg (162 lb)  Weight (lb): 162 lb  Weight Method: Bed Scale  Ideal Body " Weight (IBW), Female: 125 lb  % Ideal Body Weight, Female (lb): 129.6 %  BMI (Calculated): 27  BMI Grade: 25 - 29.9 - overweight  Usual Body Weight (UBW), k kg  % Usual Body Weight: 105.19       Estimated/Assessed Needs  Weight Used For Calorie Calculations: 73.5 kg (162 lb 0.6 oz)  Energy Calorie Requirements (kcal): 1470- 1836 kcals per day (20-25 kcal/kg/day)  Energy Need Method: Kcal/kg  Protein Requirements: 74- 110 g per day (1- 1.5 g/kg/day)  Weight Used For Protein Calculations: 73.5 kg (162 lb 0.6 oz)  Fluid Requirements (mL): 1500 mL per MD     RDA Method (mL): 1470       Reason for Assessment  Reason For Assessment: consult  Diagnosis: cardiac disease    Final diagnoses:  [M79.89] Left leg swelling  [R60.0] Bilateral lower extremity edema  [I50.9] Congestive heart failure, unspecified HF chronicity, unspecified heart failure type (Primary)     Past Medical History:   Diagnosis Date    Hyperlipemia     Hypertension         Nutrition/Diet History  Nutrition Intake History: Pt stated of a good appetite (% of meals) since admission. Pt stated she does not follow any diet at home.  Spiritual, Cultural Beliefs, Denominational Practices, Values that Affect Care: no  Food Allergies: NKFA  Factors Affecting Nutritional Intake: None identified at this time    Nutrition Risk Screen          Wound 25 2200 Contusion Left anterior Thigh-Wound Image: Images linked          Weight History:  Wt Readings from Last 10 Encounters:   25 73.5 kg (162 lb)   10/13/22 74.8 kg (165 lb)   22 69.9 kg (154 lb)   21 73.9 kg (163 lb)   20 73.9 kg (163 lb)        Lab/Procedures/Meds: Pertinent Labs/Meds Reviewed    Medications:Pertinent Medications Reviewed  Scheduled Meds:   aspirin  81 mg Oral Daily    atorvastatin  10 mg Oral Daily    cetirizine  10 mg Oral QAM    enoxparin  40 mg Subcutaneous Daily    furosemide (LASIX) injection  40 mg Intravenous Daily    hydroCHLOROthiazide  12.5 mg Oral Daily     lisinopriL  20 mg Oral Daily       Labs: Pertinent Labs Reviewed  Clinical Chemistry:  Recent Labs   Lab 04/06/25  1716 04/07/25  0355    144   K 3.8 4.6   CO2 25 29   BUN 21 21   CREATININE 1.2 1.3   CALCIUM 9.2 9.6   ALBUMIN 4.5  --    BILITOT 0.4  --    ALKPHOS 101  --    AST 38  --    ALT 34  --    GLUCOSE 88 98   MG  --  2.2       Lipid Panel:  Recent Labs   Lab 04/07/25  0355   CHOL 178   HDL 48   TRIG 83     Cardiac Profile:  Recent Labs   Lab 04/06/25  1716   BNP 1,434*         Monitor and Evaluation  Monitor and Evaluation: Beliefs and attitudes, Lipid profile, Food and nutrition knowledge, Weight, Electrolyte and renal panel, Diet order     Discharge Planning  Nutrition Discharge Planning: Therapeutic diet (comments)  Therapeutic diet (comments): heart healthy diet, low sodium diet, fluid restriction per MD    Nutrition Risk  Level of Risk/Frequency of Follow-up: low (1 x per week)     Nutrition Follow-Up  RD Follow-up?: Yes

## 2025-04-07 NOTE — SUBJECTIVE & OBJECTIVE
Past Medical History:   Diagnosis Date    Hyperlipemia     Hypertension        Past Surgical History:   Procedure Laterality Date     SECTION      INSERTION OF PACEMAKER         Review of patient's allergies indicates:  No Known Allergies    No current facility-administered medications on file prior to encounter.     Current Outpatient Medications on File Prior to Encounter   Medication Sig    amLODIPine (NORVASC) 10 MG tablet Take 10 mg by mouth once daily.    aspirin 81 MG Chew Take 81 mg by mouth once daily.    atorvastatin (LIPITOR) 10 MG tablet Take 10 mg by mouth once daily.    cetirizine (ZYRTEC) 10 MG tablet Take 1 tablet by mouth every morning.    lisinopriL (PRINIVIL,ZESTRIL) 20 MG tablet Take 20 mg by mouth once daily.    metoprolol tartrate (LOPRESSOR) 100 MG tablet Take 100 mg by mouth 2 (two) times daily.    pseudoephedrine (SUDAFED) 30 MG tablet Take 30 mg by mouth every 4 (four) hours as needed for Congestion.     Family History    None       Tobacco Use    Smoking status: Never    Smokeless tobacco: Never   Substance and Sexual Activity    Alcohol use: Not Currently     Comment: Very rare    Drug use: Never    Sexual activity: Not Currently     Review of Systems   Cardiovascular:  Positive for leg swelling.   All other systems reviewed and are negative.    Objective:     Vital Signs (Most Recent):  Temp: 97.6 °F (36.4 °C) (25)  Pulse: 65 (25)  Resp: 17 (25)  BP: (!) 140/71 (25)  SpO2: 96 % (25) Vital Signs (24h Range):  Temp:  [97.6 °F (36.4 °C)-97.9 °F (36.6 °C)] 97.6 °F (36.4 °C)  Pulse:  [64-65] 65  Resp:  [16-18] 17  SpO2:  [95 %-98 %] 96 %  BP: (133-160)/(63-76) 140/71     Weight: 73.8 kg (162 lb 11.2 oz)  Body mass index is 27.07 kg/m².     Physical Exam  Vitals reviewed.   Constitutional:       Appearance: Normal appearance.   HENT:      Head: Normocephalic and atraumatic.      Nose: Nose normal.      Mouth/Throat:      Pharynx:  Oropharynx is clear.   Eyes:      Conjunctiva/sclera: Conjunctivae normal.   Cardiovascular:      Rate and Rhythm: Normal rate and regular rhythm.      Pulses: Normal pulses.      Heart sounds: Normal heart sounds.   Pulmonary:      Effort: Pulmonary effort is normal.      Breath sounds: Normal breath sounds.   Abdominal:      General: Bowel sounds are normal. There is no distension.      Palpations: Abdomen is soft.      Tenderness: There is no abdominal tenderness.   Musculoskeletal:         General: Normal range of motion.      Cervical back: Normal range of motion and neck supple.      Right lower leg: Edema present.      Left lower leg: Edema present.   Skin:     General: Skin is warm and dry.      Capillary Refill: Capillary refill takes less than 2 seconds.   Neurological:      General: No focal deficit present.      Mental Status: She is alert and oriented to person, place, and time. Mental status is at baseline.   Psychiatric:         Mood and Affect: Mood normal.         Behavior: Behavior normal.         Thought Content: Thought content normal.         Judgment: Judgment normal.                Significant Labs: All pertinent labs within the past 24 hours have been reviewed.    Bilirubin:   Recent Labs   Lab 04/06/25  1716   BILITOT 0.4     BMP:   Recent Labs   Lab 04/06/25  1716      K 3.8   CO2 25   BUN 21   CREATININE 1.2   CALCIUM 9.2     CBC:   Recent Labs   Lab 04/06/25  1716   WBC 4.70   HGB 12.4   HCT 38.8        CMP:   Recent Labs   Lab 04/06/25  1716      K 3.8   CO2 25   BUN 21   CREATININE 1.2   CALCIUM 9.2   ALBUMIN 4.5   BILITOT 0.4   ALKPHOS 101   AST 38   ALT 34     Cardiac Markers:   Recent Labs   Lab 04/06/25  1716   BNP 1,434*       TSH:   Recent Labs   Lab 04/06/25  2009   TSH 0.749         Significant Imaging: I have reviewed all pertinent imaging results/findings within the past 24 hours.  XAMINATION:  US LOWER EXTREMITY VEINS LEFT     CLINICAL HISTORY:  Other  specified soft tissue disorders     TECHNIQUE:  Duplex and color flow Doppler evaluation and graded compression of the left lower extremity veins was performed.     COMPARISON:  None     FINDINGS:  Left thigh veins: The common femoral, femoral, popliteal, upper greater saphenous, and deep femoral veins are patent and free of thrombus. The veins are normally compressible and have normal phasic flow and augmentation response.     Left calf veins: The visualized calf veins are patent.     Contralateral CFV: The contralateral (right) common femoral vein is patent and free of thrombus.     Miscellaneous: None     Impression:     No evidence of deep venous thrombosis in the left lower extremity.        Electronically signed by:Dangelo Gomez  Date:                                            04/06/2025  Time:                                           18:19    EXAMINATION:  XR CHEST AP PORTABLE     CLINICAL HISTORY:  Shortness of breath     TECHNIQUE:  Single frontal portable view of the chest was performed.     COMPARISON:  08/07/2020     FINDINGS:  Left chest wall dual lead cardiac pacemaker.  Stable enlarged cardiac silhouette.  Aortic calcification.  Central pulmonary vascular congestion.  No focal airspace consolidation, pleural effusion, or pneumothorax.  No acute bony abnormality identified.     Impression:     As above.        Electronically signed by:Freddie Ramos  Date:                                            04/06/2025  Time:                                           16:47

## 2025-04-07 NOTE — PLAN OF CARE
Recommendations  1. Recommend adding cardiac restriction to diet.   2. Provided handout and verbal eduction of fluid and salt restricted diet.

## 2025-04-07 NOTE — PLAN OF CARE
04/07/25 0940   GUTIERREZ Message   Medicare Outpatient and Observation Notification regarding financial responsibility Given to patient/caregiver;Explained to patient/caregiver;Signed/date by patient/caregiver   Date GUTIERREZ was signed 04/07/25   Time GUTIERREZ was signed 0940

## 2025-04-07 NOTE — NURSING
Pt received to room 216 via stretcher.  Ambulated to bed without difficulty.  Pt is awake and alert, able to make needs known.  Oriented to call light, bed controls, fall precautions, and plan of care.  Pt verbalized understanding.  Educated on low sodium diet and 1.5 ml fluid restriction.  Tele box [id number] in use, and called into [facility] in monitor room.  Pt is paced on monitor at 65.  Family at bedside.

## 2025-04-07 NOTE — HOSPITAL COURSE
Alayna Bergman was closely monitored while in the hospital.  Patient was admitted to Hospital Medicine for concern of new onset of congestive heart failure.  Patient was started on IV Lasix 40 mg daily.  Echocardiogram with normal systolic function, EF 55 - 60% with normal diastolic function, Mild tricuspid regurgitation, PAS 28 mmHg and IVC 3 mmHg.  BNP on admission was 1400.  LE US negative for DVT.  Cardiology was consulted; felt swelling likely more related to venous insufficiency.  Patient endorses she had recently had an increase in salt intake after attending multiple fish and [employer] boils.  Registered dietitian consulted for education on low-sodium diet.  Patient is prescribed hydrochlorothiazide 12.5 combination with lisinopril, that she states that she has not been taking.  Patient thoroughly educated at the bedside on the need to decrease salt intake and to take all medications as prescribed.  Patient developed SERGEY; diuretics held.  Patient's renal function improved.  She also had significant improvement in her lower extremity swelling.  Patient seen and examined on day of discharge and deemed stable for discharge home.  She is to follow up with PCP and Cardiology in 1 week.

## 2025-04-07 NOTE — PLAN OF CARE
Problem: Adult Inpatient Plan of Care  Goal: Plan of Care Review  Outcome: Progressing     Problem: Adult Inpatient Plan of Care  Goal: Readiness for Transition of Care  Outcome: Progressing     Problem: Wound  Goal: Skin Health and Integrity  Outcome: Progressing     Problem: Heart Failure  Goal: Optimal Cardiac Output  Outcome: Progressing     Problem: Heart Failure  Goal: Improved Oral Intake  Outcome: Progressing     Problem: Heart Failure  Goal: Stable Heart Rate and Rhythm  Outcome: Progressing

## 2025-04-08 PROBLEM — I50.9 NEW ONSET OF CONGESTIVE HEART FAILURE: Status: ACTIVE | Noted: 2025-04-08

## 2025-04-08 LAB
ANION GAP (SMH): 9 MMOL/L (ref 8–16)
BUN SERPL-MCNC: 30 MG/DL (ref 8–23)
CALCIUM SERPL-MCNC: 9.6 MG/DL (ref 8.7–10.5)
CHLORIDE SERPL-SCNC: 101 MMOL/L (ref 95–110)
CO2 SERPL-SCNC: 29 MMOL/L (ref 23–29)
CREAT SERPL-MCNC: 1.5 MG/DL (ref 0.5–1.4)
ERYTHROCYTE [DISTWIDTH] IN BLOOD BY AUTOMATED COUNT: 14.6 % (ref 11.5–14.5)
GFR SERPLBLD CREATININE-BSD FMLA CKD-EPI: 37 ML/MIN/1.73/M2
GLUCOSE SERPL-MCNC: 124 MG/DL (ref 70–110)
HCT VFR BLD AUTO: 40.4 % (ref 37–48.5)
HGB BLD-MCNC: 12.8 GM/DL (ref 12–16)
MCH RBC QN AUTO: 27.6 PG (ref 27–31)
MCHC RBC AUTO-ENTMCNC: 31.7 G/DL (ref 32–36)
MCV RBC AUTO: 87 FL (ref 82–98)
PLATELET # BLD AUTO: 238 K/UL (ref 150–450)
PMV BLD AUTO: 10.2 FL (ref 9.2–12.9)
POTASSIUM SERPL-SCNC: 4.7 MMOL/L (ref 3.5–5.1)
RBC # BLD AUTO: 4.63 M/UL (ref 4–5.4)
SODIUM SERPL-SCNC: 139 MMOL/L (ref 136–145)
WBC # BLD AUTO: 4.17 K/UL (ref 3.9–12.7)

## 2025-04-08 PROCEDURE — 25000003 PHARM REV CODE 250

## 2025-04-08 PROCEDURE — 80048 BASIC METABOLIC PNL TOTAL CA: CPT

## 2025-04-08 PROCEDURE — 36415 COLL VENOUS BLD VENIPUNCTURE: CPT

## 2025-04-08 PROCEDURE — 63600175 PHARM REV CODE 636 W HCPCS

## 2025-04-08 PROCEDURE — 94761 N-INVAS EAR/PLS OXIMETRY MLT: CPT

## 2025-04-08 PROCEDURE — 11000001 HC ACUTE MED/SURG PRIVATE ROOM

## 2025-04-08 PROCEDURE — 85027 COMPLETE CBC AUTOMATED: CPT

## 2025-04-08 RX ORDER — AMLODIPINE BESYLATE 5 MG/1
10 TABLET ORAL DAILY
Status: DISCONTINUED | OUTPATIENT
Start: 2025-04-08 | End: 2025-04-09

## 2025-04-08 RX ORDER — METOPROLOL TARTRATE 50 MG/1
100 TABLET ORAL 2 TIMES DAILY
Status: DISCONTINUED | OUTPATIENT
Start: 2025-04-08 | End: 2025-04-10 | Stop reason: HOSPADM

## 2025-04-08 RX ADMIN — ASPIRIN 81 MG CHEWABLE TABLET 81 MG: 81 TABLET CHEWABLE at 08:04

## 2025-04-08 RX ADMIN — ATORVASTATIN CALCIUM 10 MG: 10 TABLET, FILM COATED ORAL at 08:04

## 2025-04-08 RX ADMIN — ENOXAPARIN SODIUM 40 MG: 40 INJECTION SUBCUTANEOUS at 04:04

## 2025-04-08 RX ADMIN — AMLODIPINE BESYLATE 10 MG: 5 TABLET ORAL at 01:04

## 2025-04-08 RX ADMIN — LISINOPRIL 20 MG: 10 TABLET ORAL at 08:04

## 2025-04-08 RX ADMIN — FUROSEMIDE 40 MG: 10 INJECTION, SOLUTION INTRAVENOUS at 08:04

## 2025-04-08 RX ADMIN — HYDROCHLOROTHIAZIDE 12.5 MG: 12.5 TABLET ORAL at 08:04

## 2025-04-08 RX ADMIN — CETIRIZINE HYDROCHLORIDE 10 MG: 10 TABLET, FILM COATED ORAL at 08:04

## 2025-04-08 NOTE — PLAN OF CARE
AAO x 4, VSS, Paced on cardiac monitor, daughter at bedside, strict I&O, no complaints of pain, instructed to call for assistance, SR up x 2, free from falls, resting between care.     Problem: Adult Inpatient Plan of Care  Goal: Plan of Care Review  Outcome: Progressing     Problem: Adult Inpatient Plan of Care  Goal: Patient-Specific Goal (Individualized)  Outcome: Progressing  Flowsheets (Taken 4/8/2025 4958)  Individualized Care Needs: 1.5 fluid restriction, strict I&O  Anxieties, Fears or Concerns: none  Patient/Family-Specific Goals (Include Timeframe): decrease bilateral  lower leg edema     Problem: Adult Inpatient Plan of Care  Goal: Optimal Comfort and Wellbeing  Outcome: Progressing     Problem: Heart Failure  Goal: Optimal Functional Ability  Outcome: Progressing     Problem: Heart Failure  Goal: Fluid and Electrolyte Balance  Outcome: Progressing     Problem: Heart Failure  Goal: Effective Breathing Pattern During Sleep  Outcome: Progressing     Problem: Heart Failure  Goal: Effective Breathing Pattern During Sleep  Outcome: Progressing

## 2025-04-08 NOTE — PLAN OF CARE
"Plan of care review with pt, pt states "understanding,"  pace maker present to right CW, pt denies SOB with ambulation, +2 edema palpated to BLE with + palpable pedal pulses, ambulating ad jeferson, large bruise noted to left inner thigh with small hematoma palpated, pt c/o tenderness to site, ice pack applied, no redness/swelling noted to IV site, will continue to monitor, observe and note any changes, safety maintain    " Chief Complaint   Patient presents with   • Follow-up     Cardiac management   • LABS     CT scan- 12-2-2024 and LABS 2- , results are in door   • Palpitations     Patient reports she has flutters at times . And has SOA with climbing stairs  Has elevated BP today, patient said she has not taken Lasix today   • Med Refill     Requests refills on Coreg, lasix,losartan and Nifedipine 90 day supply to [facility]        CARDIAC COMPLAINTS  dyspnea and palpitations        Subjective   Yessenia Baxter is a 68 y.o. female came in today for her regular follow-up visit.  She has history of hypertension, hypercholesterolemia, valvular heart disease and also has connective tissue disorder.  She has been treated with Liqvio and so far has tolerated well.  Her lab work which was done in December showed total cholesterol of 172, triglycerides 318 and LDL of 40.  She has undergone the shoulder surgery without any problem.  Her main symptom is shortness of breath mostly on climbing stairs.  She also has been noticing increasing the blood pressure.  She has not taken the diuretic today.            Cardiac History  Past Surgical History:   Procedure Laterality Date   • CARDIOVASCULAR STRESS TEST  09/16/2015    R. Stress- ([facility]) 6 Min, 90% THR. BP- 204/100. Few PVC's. Negative    • CARDIOVASCULAR STRESS TEST  11/28/2016    @[facility]. 6 Min, 89%THR. BP- 210/106. Negative   • ECHO - CONVERTED  09/16/2015     Echo- ([facility]) EF 60%. Mod AI. RVSP- 50 mmHg    • ECHO - CONVERTED  11/28/2016    @[facility]. EF 65%. Mild- Mod AI. RVSP-51 mmHg   • ECHO - CONVERTED  11/21/2017    @[facility]. EF 65%. Mild- Mod AI. Mild MR. RVSP- 54 mmHg   • ECHO - CONVERTED  03/29/2023    EF 65%.Mild-Mod AI, Mild MR. RVSP- 41 mmHg       Current Outpatient Medications   Medication Sig Dispense Refill   • Acetaminophen (TYLENOL ARTHRITIS PAIN PO) Take 1,000 mg by mouth Every Night.     • BLACK COHOSH PO Take  by mouth 2 (Two) Times a Day.     • carvedilol (COREG) 12.5 MG tablet Take 1  tablet by mouth 2 (Two) Times a Day. 180 tablet 4   • Cyanocobalamin (VITAMIN B-12 IJ) Inject  as directed Every 14 (Fourteen) Days.     • furosemide (LASIX) 20 MG tablet Take 1 tablet by mouth As Needed (Leg edema). 30 tablet 11   • gabapentin (NEURONTIN) 100 MG capsule Take 1 capsule by mouth 3 (Three) Times a Day.     • Inclisiran Sodium (Leqvio) 284 MG/1.5ML solution prefilled syringe Inject 1.5 mL under the skin into the appropriate area as directed Every 6 (Six) Months.     • leflunomide (ARAVA) 10 MG tablet Take 1 tablet by mouth Daily.     • levothyroxine (SYNTHROID, LEVOTHROID) 112 MCG tablet Take 1 tablet by mouth Daily.     • lidocaine (LIDODERM) 5 % Place 1 patch on the skin as directed by provider Daily As Needed for Mild Pain. Remove & Discard patch within 12 hours or as directed by MD     • losartan (COZAAR) 100 MG tablet Take 1 tablet by mouth Daily. 90 tablet 3   • Multiple Vitamin (MULTI VITAMIN PO) Take  by mouth Daily.     • pantoprazole (PROTONIX) 40 MG EC tablet Take 1 tablet by mouth Daily.     • Probiotic Product (SOLUBLE FIBER/PROBIOTICS PO) Take 1 capsule by mouth Daily.     • sucralfate (CARAFATE) 1 g tablet Take 1 tablet by mouth 3 (Three) Times a Day As Needed.     • traMADol (ULTRAM) 50 MG tablet Take 1 tablet by mouth Every Night.     • traZODone (DESYREL) 50 MG tablet Take 0.25 tablets by mouth Every Night.     • vitamin D (ERGOCALCIFEROL) 56338 UNITS capsule capsule Take 1 capsule by mouth Every 7 (Seven) Days.     • NIFEdipine XL (Procardia XL) 90 MG 24 hr tablet Take 1 tablet by mouth Daily. 90 tablet 4     No current facility-administered medications for this visit.       Allergies  :  Hydrochlorothiazide, Spironolactone, Aspirin, Zetia [ezetimibe], Demerol [meperidine], Latex, Other, Penicillins, Simvastatin, Tetracyclines & related, Crestor [rosuvastatin calcium], and Lipitor [atorvastatin]       Past Medical History:   Diagnosis Date   • Abnormal ECG 2014   • Arrhythmia 2014    • Arthritis    • Cyst removed from right hand    • Esophageal reflux    • Fibromyalgia    • H/O shoulder surgery     right   • H/O: hysterectomy    • Heart murmur 2014   • Heart valve disease 2014   • History of cholecystectomy    • Hypercholesterolemia    • Hypertension    • Hypothyroidism    • Left knee surgery twice    • Lupus     Followed rheumatologist   • RA (rheumatoid arthritis)     Bilateral Hip   • Sarcoidosis    • Sleep apnea     Does not wear CPAP       Social History     Socioeconomic History   • Marital status:    Tobacco Use   • Smoking status: Never     Passive exposure: Past   • Smokeless tobacco: Never   Vaping Use   • Vaping status: Never Used   Substance and Sexual Activity   • Alcohol use: No   • Drug use: No   • Sexual activity: Yes     Partners: Male     Birth control/protection: None       Family History   Problem Relation Age of Onset   • Stroke Mother 76   • Other Mother         History of A Fib.   • Anemia Mother    • Hypertension Mother    • Heart attack Father    • Heart failure Father    • Other Other          one at age 43 had AVR. one had stents at 67,    • Heart attack Maternal Grandfather         Massive heart attack   • Heart disease Brother         open heart surgery; valve replacement   • Hypertension Brother    • Heart disease Brother         Open heart surgery; MI   • Hyperlipidemia Brother    • Hypertension Brother    • Hypertension Sister        Review of Systems   Constitutional: Negative for decreased appetite and malaise/fatigue.   HENT:  Negative for congestion and sore throat.    Eyes:  Negative for blurred vision, double vision and visual disturbance.   Cardiovascular:  Positive for dyspnea on exertion and palpitations. Negative for chest pain.   Respiratory:  Positive for shortness of breath. Negative for snoring.    Endocrine: Negative for cold intolerance and heat intolerance.   Hematologic/Lymphatic: Negative for adenopathy. Does not bruise/bleed easily.  "  Skin:  Negative for itching, nail changes and skin cancer.   Musculoskeletal:  Negative for arthritis and myalgias.   Gastrointestinal:  Negative for abdominal pain, dysphagia and heartburn.   Genitourinary:  Negative for bladder incontinence and frequency.   Neurological:  Negative for dizziness, seizures and vertigo.   Psychiatric/Behavioral:  Negative for altered mental status.    Allergic/Immunologic: Negative for environmental allergies and hives.     Diabetes- No  Thyroid- abnormal    Objective     /92 (BP Location: Left arm, Patient Position: Sitting, Cuff Size: Adult)   Pulse 72   Ht 172.7 cm (67.99\")   Wt 87.1 kg (192 lb)   BMI 29.20 kg/m²     Vitals and nursing note reviewed.   Constitutional:       Appearance: Healthy appearance. Not in distress.   Eyes:      Conjunctiva/sclera: Conjunctivae normal.      Pupils: Pupils are equal, round, and reactive to light.   HENT:      Head: Normocephalic.   Pulmonary:      Effort: Pulmonary effort is normal.      Breath sounds: Normal breath sounds.   Cardiovascular:      PMI at left midclavicular line. Normal rate. Regular rhythm.      Murmurs: There is a grade 3/4 high frequency blowing decrescendo, early diastolic murmur at the URSB, radiating to the apex.   Abdominal:      General: Bowel sounds are normal.      Palpations: Abdomen is soft.   Musculoskeletal: Normal range of motion.      Cervical back: Normal range of motion and neck supple. Skin:     General: Skin is warm and dry.   Neurological:      Mental Status: Alert, oriented to person, place, and time and oriented to person, place and time.   Procedures            @ASSESSMENT/PLAN@        Diagnoses and all orders for this visit:    1. Essential hypertension (Primary)  -     carvedilol (COREG) 12.5 MG tablet; Take 1 tablet by mouth 2 (Two) Times a Day.  Dispense: 180 tablet; Refill: 4  -     losartan (COZAAR) 100 MG tablet; Take 1 tablet by mouth Daily.  Dispense: 90 tablet; Refill: 3  -     " NIFEdipine XL (Procardia XL) 90 MG 24 hr tablet; Take 1 tablet by mouth Daily.  Dispense: 90 tablet; Refill: 4  -     Comprehensive Metabolic Panel; Future    2. Aortic valve insufficiency, etiology of cardiac valve disease unspecified  -     Adult Transthoracic Echo Complete W/ Cont if Necessary Per Protocol; Future    3. Hypercholesteremia  -     Lipid Panel; Future    4. PHT (pulmonary hypertension)  -     CBC & Differential; Future    5. Shortness of breath  -     CBC & Differential; Future  -     proBNP; Future  -     D-dimer, Quantitative; Future    At baseline her heart rate is stable but the blood pressure is elevated.  Her clinical examination reveals a BMI still at 29.  Her cardiovascular examination is remarkable for early diastolic murmur at the aortic area.    Regarding her hypertension, she is already on Coreg, Cozaar and Procardia XL.  At this time since her blood pressure is still elevated and she is tolerating Procardia, I increased her dose to 90 mg.  She is advised to recheck the electrolytes and renal function    Her aortic insufficiency has been running mild to moderate in 2023.  I like to re evaluate the AI by repeating the echocardiogram.    Her cholesterol is being managed.  Will recheck the lipid panel along with LFT    She did have mild pulmonary hypertension, like to reevaluate the PA pressure by the echocardiogram    Her shortness of breath is chronic.  Since it is little more after the surgery, I like to check her BNP level and D-dimer    Regarding advance directive, she does have a living will and power of     I will see her back in 6 months or sooner if needed                    Electronically signed by Ja Bojorquez MD February 25, 2025 13:28 EST

## 2025-04-08 NOTE — SUBJECTIVE & OBJECTIVE
Interval History: Patient seen and examined. No acute overnight events. NAD on RA. Daughter at bedside. Patient denies any chest pain or ever experiencing any shortness of breath. BLE edema noted. She states she follows cardiology in [place], Dr. Wong for pacemaker.     Review of Systems   Constitutional:  Negative for activity change, appetite change, chills, diaphoresis, fatigue and fever.   Respiratory:  Negative for cough and shortness of breath.    Cardiovascular:  Positive for leg swelling. Negative for chest pain.   Gastrointestinal:  Negative for abdominal distention, abdominal pain, nausea and vomiting.   Genitourinary:  Negative for difficulty urinating and flank pain.   Musculoskeletal:  Negative for back pain.   Neurological:  Negative for dizziness, seizures, syncope, facial asymmetry, speech difficulty, light-headedness, numbness and headaches.     Objective:     Vital Signs (Most Recent):  Temp: 98.2 °F (36.8 °C) (04/08/25 1125)  Pulse: 65 (04/08/25 1125)  Resp: 16 (04/08/25 1125)  BP: (!) 156/72 (04/08/25 1125)  SpO2: 97 % (04/08/25 1125) Vital Signs (24h Range):  Temp:  [97.6 °F (36.4 °C)-98.2 °F (36.8 °C)] 98.2 °F (36.8 °C)  Pulse:  [65-68] 65  Resp:  [14-18] 16  SpO2:  [95 %-98 %] 97 %  BP: ()/(56-72) 156/72     Weight: 73.5 kg (162 lb)  Body mass index is 26.96 kg/m².    Intake/Output Summary (Last 24 hours) at 4/8/2025 1250  Last data filed at 4/8/2025 1151  Gross per 24 hour   Intake 600 ml   Output 1900 ml   Net -1300 ml         Physical Exam  Vitals and nursing note reviewed.   Constitutional:       Appearance: She is not ill-appearing.   Eyes:      Pupils: Pupils are equal, round, and reactive to light.   Cardiovascular:      Rate and Rhythm: Normal rate.      Pulses: Normal pulses.           Dorsalis pedis pulses are 2+ on the right side and 2+ on the left side.        Posterior tibial pulses are 2+ on the right side and 2+ on the left side.      Heart sounds: Normal heart  sounds.      Comments: Ventricular paced  Pulmonary:      Effort: Pulmonary effort is normal. No respiratory distress.      Breath sounds: Normal breath sounds. No wheezing.   Abdominal:      General: Bowel sounds are normal. There is no distension.      Palpations: Abdomen is soft.      Tenderness: There is no abdominal tenderness. There is no guarding.   Musculoskeletal:      Right lower le+ Edema present.      Left lower le+ Edema present.   Skin:     General: Skin is warm and dry.      Capillary Refill: Capillary refill takes less than 2 seconds.   Neurological:      Mental Status: She is alert and oriented to person, place, and time.      GCS: GCS eye subscore is 4. GCS verbal subscore is 5. GCS motor subscore is 6.               Significant Labs: All pertinent labs within the past 24 hours have been reviewed.  A1C:   Recent Labs   Lab 25   HGBA1C 6.1*       Bilirubin:   Recent Labs   Lab 25   BILITOT 0.4       BMP:   Recent Labs   Lab 25  0355 25  0400    139   K 4.6 4.7   CO2 29 29   BUN 21 30*   CREATININE 1.3 1.5*   CALCIUM 9.6 9.6   MG 2.2  --      CBC:   Recent Labs   Lab 25  0355 25  0349   WBC 4.70 4.25 4.17   HGB 12.4 12.4 12.8   HCT 38.8 39.4 40.4    244 238     CMP:   Recent Labs   Lab 25  0355 25  0400    144 139   K 3.8 4.6 4.7   CO2 25 29 29   BUN 21 21 30*   CREATININE 1.2 1.3 1.5*   CALCIUM 9.2 9.6 9.6   ALBUMIN 4.5  --   --    BILITOT 0.4  --   --    ALKPHOS 101  --   --    AST 38  --   --    ALT 34  --   --      Cardiac Markers:   Recent Labs   Lab 25   BNP 1,434*       Lipid Panel:   Recent Labs   Lab 25  0355   CHOL 178   HDL 48   TRIG 83     Magnesium:   Recent Labs   Lab 25  0355   MG 2.2       TSH:   Recent Labs   Lab 25   TSH 0.749         Significant Imaging: I have reviewed all pertinent imaging results/findings within the past 24  "hours.    Transthoracic echo (TTE) complete    Height: 5' 5" (1.651 m)   Weight: 73.5 kg (162 lb)   Blood Pressure: 113/66    Date of Study: 4/7/25   Ordering Provider: Rachael Burton NP    Clinical Indications: New onset of congestive heart failure [I50.9 (ICD-10-CM)]       Reading Physicians  Performing Staff   Cardiology: Thanh Hansen MD     Tech: Yue Milton         Reason for Exam  Priority: Routine  Dx: New onset of congestive heart failure [I50.9 (ICD-10-CM)]     View Images Vital Vitrea     Show images for Echo  Summary  Show Result Comparison     Left Ventricle: The left ventricle is normal in size. Normal wall thickness. Normal wall motion. There is normal systolic function with a visually estimated ejection fraction of 55 - 60%. There is normal diastolic function.    Right Ventricle: The right ventricle is normal in size. Wall thickness is normal. Systolic function is normal.    Tricuspid Valve: There is mild regurgitation.    Pulmonary Artery: The estimated pulmonary artery systolic pressure is 28 mmHg.    IVC/SVC: Normal venous pressure at 3 mmHg.     Vitals    Height Weight BMI (Calculated) BSA (Calculated - sq m) BP Pulse   5' 5" (1.651 m) 73.5 kg (162 lb) 27 1.84 sq meters 113/66 65     Study Details A complete echo was performed using complete 2D, color flow Doppler and spectral Doppler. During the study, the apical, parasternal and subcostal views were captured. This was a portable study performed at the patient's bedside.     Echocardiography Findings    Left Ventricle The left ventricle is normal in size. Normal wall thickness. Normal wall motion. There is normal systolic function with a visually estimated ejection fraction of 55 - 60%. There is normal diastolic function.   Right Ventricle The right ventricle is normal in size. Wall thickness is normal. Systolic function is normal.   Left Atrium Normal left atrial size.   Right Atrium Normal right atrial size.   Aortic Valve The " "aortic valve is structurally normal. There is normal leaflet mobility. Aortic valve peak velocity is 1.2 m/s. Mean gradient is 3 mmHg. There is trace aortic regurgitation.   Mitral Valve The mitral valve is structurally normal. There is normal leaflet mobility. The mean pressure gradient across the mitral valve is 1 mmHg at a heart rate of  bpm.   Tricuspid Valve The tricuspid valve is structurally normal. There is normal leaflet mobility. There is mild regurgitation.   Pulmonic Valve The pulmonic valve is structurally normal. There is normal leaflet mobility.   IVC/SVC Normal venous pressure at 3 mmHg.   Ascending Aorta Aortic root is normal in size. Aortic root at ST junction is normal. Ascending aorta is normal measuring 2.82 cm.   Pericardium and Other Findings There is no pericardial effusion.   Pulmonary Artery The estimated pulmonary artery systolic pressure is 28 mmHg.     Exam Details    Performed Procedure Technologist     Transthoracic echo (TTE) Yue Cunningham            Begin Exam End Exam     4/7/2025 11:02 AM CDT 4/7/2025 11:40 AM CDT              2D Measurements    Dimensions   LVIDd 4 cm  (Range: 3.5 - 6.0)         LVIDs 2.9 cm  (Range: 2.1 - 4.0)         IVS 0.8 cm  (Range: 0.6 - 1.1)         PW 0.8 cm  (Range: 0.6 - 1.1)         FS 27.5 %  (Range: 28 - 44) Abnormal          LV mass 93.5 g         LA Vol (MOD) 44 mL         ALEXA (MOD) 24 mL/m2          Dimensions   RVDD 2.97 cm         RV Basal Diameter 5.81 cm         TAPSE 2.09 cm         RV S' 12.81 cm/s         RA Major Axis 3.83 cm          Aortic Root - End Diastolic   Ao root annulus 2.45 cm         STJ 2.51 cm         Ascending aorta 2.82 cm          Inferior Vena Cava   Est. RA pres 3 mmHg               Doppler Measurements - Diastolic Filling    Diastolic Filling   E/A ratio 0.7         IVRT 114 msec         Mean e' 0.05 m/s          E wave deceleration time 228 msec         MV "A" wave duration [id number] msec       "   E/E' ratio 12 m/s              Doppler Measurements - Aortic Valve    Stenosis   LVOT diameter 2 cm         LVOT area 3.1 cm2         LVOT peak jonh 1 m/s         LVOT peak VTI 16.3 cm         Left Ventricular Outflow Tract Mean Velocity 0.65 cm/s         Left Ventricular Outflow Tract Mean Gradient 1.97 mmHg         Ao peak jonh 1.2 m/s         Ao VTI 20.9 cm         AV valve area 2.4 cm²         AV mean gradient 3 mmHg         AV peak gradient 6 mmHg         AV Velocity Ratio 0.83         AV index (prosthetic) 0.78          Regurgitation   AV regurgitation pressure 1/2 time 1,301 ms          PISA   AR Max Jonh 3.76 m/s               Doppler Measurements - Mitral Valve    Stenosis   MV mean gradient 1 mmHg         MV peak gradient 3 mmHg         MV valve area by continuity eq 3.01 cm2         MV valve area p 1/2 method 4.21 cm2          PISA-MS   MV Peak E Jonh 0.62 m/s          PISA-MR   Mr max jonh 4.49 m/s                Doppler Measurements - Pulmonic Valve    Stenosis   Pulmonary Valve Mean Velocity 0.52 m/s         PV peak gradient 1 mmHg         PV PEAK VELOCITY 0.61 m/s               Doppler Measurements - Shunt Ratio    Shunt Ratio   LVOT stroke volume 51.2 cm3              Doppler Measurements - Tricuspid Valve    Stress Evaluation   TV resting pulmonary artery pressure 28 mmHg                 Medications  (Filter: Administrations occurring from 0000 to 1215 on 04/07/25)  None     Signed    Electronically signed by Thanh Hansen MD on 4/7/25 at 1312 CDT     US Lower Extremity Veins Left  Order: [id number]   Status: Final result       Next appt: None       Dx: Left leg swelling    Test Result Released: Yes (not seen)    0 Result Notes  Details    Reading Physician Reading Date Result Priority   Dangelo Gomez, DO  [phone]  4/6/2025 STAT     Narrative & Impression  EXAMINATION:  US LOWER EXTREMITY VEINS LEFT     CLINICAL HISTORY:  Other specified soft tissue disorders     TECHNIQUE:  Duplex and  color flow Doppler evaluation and graded compression of the left lower extremity veins was performed.     COMPARISON:  None     FINDINGS:  Left thigh veins: The common femoral, femoral, popliteal, upper greater saphenous, and deep femoral veins are patent and free of thrombus. The veins are normally compressible and have normal phasic flow and augmentation response.     Left calf veins: The visualized calf veins are patent.     Contralateral CFV: The contralateral (right) common femoral vein is patent and free of thrombus.     Miscellaneous: None     Impression:     No evidence of deep venous thrombosis in the left lower extremity.        Electronically signed by:Dangelo Gomez  Date:                                            04/06/2025  Time:                                           18:19        Exam Ended: 04/06/25 18:10 CDT Last Resulted: 04/06/25 18:19 CDT     X-Ray Chest AP Portable  Order: [id number]   Status: Final result       Next appt: None       Dx: Bilateral lower extremity edema    Test Result Released: Yes (not seen)    0 Result Notes  Details    Reading Physician Reading Date Result Priority   Freddie Ramos MD  [phone]  4/6/2025 STAT     Narrative & Impression  EXAMINATION:  XR CHEST AP PORTABLE     CLINICAL HISTORY:  Shortness of breath     TECHNIQUE:  Single frontal portable view of the chest was performed.     COMPARISON:  08/07/2020     FINDINGS:  Left chest wall dual lead cardiac pacemaker.  Stable enlarged cardiac silhouette.  Aortic calcification.  Central pulmonary vascular congestion.  No focal airspace consolidation, pleural effusion, or pneumothorax.  No acute bony abnormality identified.     Impression:     As above.        Electronically signed by:Freddie Ramos  Date:                                            04/06/2025  Time:                                           16:47        Exam Ended: 04/06/25 16:10 CDT Last Resulted: 04/06/25 16:47 CDT

## 2025-04-08 NOTE — ASSESSMENT & PLAN NOTE
Patient has unknown heart failure that is Acute. On presentation their CHF was decompensated. Evidence of decompensated CHF on presentation includes: edema. The etiology of their decompensation is likely dietary indiscretion and medication non-compliance. Most recent BNP and echo results are listed below.  Recent Labs     04/06/25  1716   BNP 1,434*     Latest ECHO  Results for orders placed during the hospital encounter of 04/06/25    Echo    Interpretation Summary    Left Ventricle: The left ventricle is normal in size. Normal wall thickness. Normal wall motion. There is normal systolic function with a visually estimated ejection fraction of 55 - 60%. There is normal diastolic function.    Right Ventricle: The right ventricle is normal in size. Wall thickness is normal. Systolic function is normal.    Tricuspid Valve: There is mild regurgitation.    Pulmonary Artery: The estimated pulmonary artery systolic pressure is 28 mmHg.    IVC/SVC: Normal venous pressure at 3 mmHg.    Current Heart Failure Medications  furosemide injection 40 mg, Daily, Intravenous  lisinopriL tablet 20 mg, Daily, Oral  hydroCHLOROthiazide tablet 12.5 mg, Daily, Oral    Plan  - Monitor strict I&Os and daily weights.    - Place on telemetry  - Low sodium diet  - Place on fluid restriction of 1.5 L.   - Cardiology has been consulted  - The patient's volume status is stable but not at their baseline as indicated by edema

## 2025-04-08 NOTE — PROGRESS NOTES
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 Patient reports increased leg swelling for about two weeks but significantly worse over one week. She denies any shortness of breath or increased pillow usage at night to sleep. Reports she sleeps propped up but could lay flat with no issue to sleep. Patient is followed by Dr. Collier in [facility] and last note from 10/10/24 in care everywhere. In this note it reports that she is on HCTZ but then it is not listed in her med list. Patient reports she does not take a daily diuretic and the only time she ever took a "fluid pill" was when they prescribed her three days of furosemide after visiting urgent care for leg swelling related to taking PO minoxidil. Initial ED labs showed a BNP of 1,434 and chest xray showed central pulmonary vascular congestion. CBC normal. CMP showed creatinine of 1.2 with GFR of 48. TSH normal. EKG shows a ventricular paced rhythm. Last Echo from 2020 showed EF of 62%. Patient does report some dietary indiscretion and increased salty foods over the last two weeks. Venous US of left lower extremity negative for DVT. Patient administered 40mg of lasix in ED and admitted by hospital medicine for further evaluation and management.     Overview/Hospital Course:  Alayna Bergman was closely monitored while in the hospital.  Patient was admitted to Hospital Medicine for new onset of congestive heart failure.  Patient " was started on IV Lasix 40 mg daily.  Echocardiogram with normal systolic function, EF 55 - 60% with normal diastolic function, Mild tricuspid regurgitation, PAS 28 mmHg and IVC 3 mmHg.  BNP on admission was 1400.  Patient endorses she had recently had a increase in salt intake after attending multiple fish rods and crawfish balls.  Registered dietitian consulted for education on low-sodium diet.  Patient is prescribed hydrochlorothiazide 12.5 combination with lisinopril, that she states that she has not been taking.  Patient is unsure why she has not feel this medication.  Patient thoroughly educated at the bedside on the need to decrease salt intake and to take all medications as prescribed.  Medication resumed during hospitalization.  Ultrasound BLE negative for DVT.          Interval History: Patient seen and examined. No acute overnight events. NAD on RA. Daughter at bedside. Patient denies any chest pain or ever experiencing any shortness of breath. BLE edema noted. She states she follows cardiology in [place], Dr. Wong for pacemaker.     Review of Systems   Constitutional:  Negative for activity change, appetite change, chills, diaphoresis, fatigue and fever.   Respiratory:  Negative for cough and shortness of breath.    Cardiovascular:  Positive for leg swelling. Negative for chest pain.   Gastrointestinal:  Negative for abdominal distention, abdominal pain, nausea and vomiting.   Genitourinary:  Negative for difficulty urinating and flank pain.   Musculoskeletal:  Negative for back pain.   Neurological:  Negative for dizziness, seizures, syncope, facial asymmetry, speech difficulty, light-headedness, numbness and headaches.     Objective:     Vital Signs (Most Recent):  Temp: 98.2 °F (36.8 °C) (04/08/25 1125)  Pulse: 65 (04/08/25 1125)  Resp: 16 (04/08/25 1125)  BP: (!) 156/72 (04/08/25 1125)  SpO2: 97 % (04/08/25 1125) Vital Signs (24h Range):  Temp:  [97.6 °F (36.4 °C)-98.2 °F (36.8 °C)] 98.2 °F  (36.8 °C)  Pulse:  [65-68] 65  Resp:  [14-18] 16  SpO2:  [95 %-98 %] 97 %  BP: ()/(56-72) 156/72     Weight: 73.5 kg (162 lb)  Body mass index is 26.96 kg/m².    Intake/Output Summary (Last 24 hours) at 2025 1250  Last data filed at 2025 1151  Gross per 24 hour   Intake 600 ml   Output 1900 ml   Net -1300 ml         Physical Exam  Vitals and nursing note reviewed.   Constitutional:       Appearance: She is not ill-appearing.   Eyes:      Pupils: Pupils are equal, round, and reactive to light.   Cardiovascular:      Rate and Rhythm: Normal rate.      Pulses: Normal pulses.           Dorsalis pedis pulses are 2+ on the right side and 2+ on the left side.        Posterior tibial pulses are 2+ on the right side and 2+ on the left side.      Heart sounds: Normal heart sounds.      Comments: Ventricular paced  Pulmonary:      Effort: Pulmonary effort is normal. No respiratory distress.      Breath sounds: Normal breath sounds. No wheezing.   Abdominal:      General: Bowel sounds are normal. There is no distension.      Palpations: Abdomen is soft.      Tenderness: There is no abdominal tenderness. There is no guarding.   Musculoskeletal:      Right lower le+ Edema present.      Left lower le+ Edema present.   Skin:     General: Skin is warm and dry.      Capillary Refill: Capillary refill takes less than 2 seconds.   Neurological:      Mental Status: She is alert and oriented to person, place, and time.      GCS: GCS eye subscore is 4. GCS verbal subscore is 5. GCS motor subscore is 6.               Significant Labs: All pertinent labs within the past 24 hours have been reviewed.  A1C:   Recent Labs   Lab 25   HGBA1C 6.1*       Bilirubin:   Recent Labs   Lab 25  1716   BILITOT 0.4       BMP:   Recent Labs   Lab 25  0355 25  0400    139   K 4.6 4.7   CO2 29 29   BUN 21 30*   CREATININE 1.3 1.5*   CALCIUM 9.6 9.6   MG 2.2  --      CBC:   Recent Labs   Lab  "04/06/25  1716 04/07/25  0355 04/08/25  0349   WBC 4.70 4.25 4.17   HGB 12.4 12.4 12.8   HCT 38.8 39.4 40.4    244 238     CMP:   Recent Labs   Lab 04/06/25  1716 04/07/25  0355 04/08/25  0400    144 139   K 3.8 4.6 4.7   CO2 25 29 29   BUN 21 21 30*   CREATININE 1.2 1.3 1.5*   CALCIUM 9.2 9.6 9.6   ALBUMIN 4.5  --   --    BILITOT 0.4  --   --    ALKPHOS 101  --   --    AST 38  --   --    ALT 34  --   --      Cardiac Markers:   Recent Labs   Lab 04/06/25 1716   BNP 1,434*       Lipid Panel:   Recent Labs   Lab 04/07/25  0355   CHOL 178   HDL 48   TRIG 83     Magnesium:   Recent Labs   Lab 04/07/25  0355   MG 2.2       TSH:   Recent Labs   Lab 04/06/25 2009   TSH 0.749         Significant Imaging: I have reviewed all pertinent imaging results/findings within the past 24 hours.    Transthoracic echo (TTE) complete    Height: 5' 5" (1.651 m)   Weight: 73.5 kg (162 lb)   Blood Pressure: 113/66    Date of Study: 4/7/25   Ordering Provider: Rachael Burton NP    Clinical Indications: New onset of congestive heart failure [I50.9 (ICD-10-CM)]       Reading Physicians  Performing Staff   Cardiology: Thanh Hansen MD     Tech: Yue Milton         Reason for Exam  Priority: Routine  Dx: New onset of congestive heart failure [I50.9 (ICD-10-CM)]     View Images Vital Vitrea     Show images for Echo  Summary  Show Result Comparison     Left Ventricle: The left ventricle is normal in size. Normal wall thickness. Normal wall motion. There is normal systolic function with a visually estimated ejection fraction of 55 - 60%. There is normal diastolic function.    Right Ventricle: The right ventricle is normal in size. Wall thickness is normal. Systolic function is normal.    Tricuspid Valve: There is mild regurgitation.    Pulmonary Artery: The estimated pulmonary artery systolic pressure is 28 mmHg.    IVC/SVC: Normal venous pressure at 3 mmHg.     Vitals    Height Weight BMI (Calculated) BSA (Calculated - " "sq m) BP Pulse   5' 5" (1.651 m) 73.5 kg (162 lb) 27 1.84 sq meters 113/66 65     Study Details A complete echo was performed using complete 2D, color flow Doppler and spectral Doppler. During the study, the apical, parasternal and subcostal views were captured. This was a portable study performed at the patient's bedside.     Echocardiography Findings    Left Ventricle The left ventricle is normal in size. Normal wall thickness. Normal wall motion. There is normal systolic function with a visually estimated ejection fraction of 55 - 60%. There is normal diastolic function.   Right Ventricle The right ventricle is normal in size. Wall thickness is normal. Systolic function is normal.   Left Atrium Normal left atrial size.   Right Atrium Normal right atrial size.   Aortic Valve The aortic valve is structurally normal. There is normal leaflet mobility. Aortic valve peak velocity is 1.2 m/s. Mean gradient is 3 mmHg. There is trace aortic regurgitation.   Mitral Valve The mitral valve is structurally normal. There is normal leaflet mobility. The mean pressure gradient across the mitral valve is 1 mmHg at a heart rate of  bpm.   Tricuspid Valve The tricuspid valve is structurally normal. There is normal leaflet mobility. There is mild regurgitation.   Pulmonic Valve The pulmonic valve is structurally normal. There is normal leaflet mobility.   IVC/SVC Normal venous pressure at 3 mmHg.   Ascending Aorta Aortic root is normal in size. Aortic root at ST junction is normal. Ascending aorta is normal measuring 2.82 cm.   Pericardium and Other Findings There is no pericardial effusion.   Pulmonary Artery The estimated pulmonary artery systolic pressure is 28 mmHg.     Exam Details    Performed Procedure Technologist     Transthoracic echo (TTE) Yue Cunningham            Begin Exam End Exam     4/7/2025 11:02 AM CDT 4/7/2025 11:40 AM CDT              2D Measurements    Dimensions   LVIDd 4 cm  (Range: 3.5 - 6.0)    " "     LVIDs 2.9 cm  (Range: 2.1 - 4.0)         IVS 0.8 cm  (Range: 0.6 - 1.1)         PW 0.8 cm  (Range: 0.6 - 1.1)         FS 27.5 %  (Range: 28 - 44) Abnormal          LV mass 93.5 g         LA Vol (MOD) 44 mL         ALEXA (MOD) 24 mL/m2          Dimensions   RVDD 2.97 cm         RV Basal Diameter 5.81 cm         TAPSE 2.09 cm         RV S' 12.81 cm/s         RA Major Axis 3.83 cm          Aortic Root - End Diastolic   Ao root annulus 2.45 cm         STJ 2.51 cm         Ascending aorta 2.82 cm          Inferior Vena Cava   Est. RA pres 3 mmHg               Doppler Measurements - Diastolic Filling    Diastolic Filling   E/A ratio 0.7         IVRT 114 msec         Mean e' 0.05 m/s          E wave deceleration time 228 msec         MV "A" wave duration [id number] msec         E/E' ratio 12 m/s              Doppler Measurements - Aortic Valve    Stenosis   LVOT diameter 2 cm         LVOT area 3.1 cm2         LVOT peak aliya 1 m/s         LVOT peak VTI 16.3 cm         Left Ventricular Outflow Tract Mean Velocity 0.65 cm/s         Left Ventricular Outflow Tract Mean Gradient 1.97 mmHg         Ao peak aliya 1.2 m/s         Ao VTI 20.9 cm         AV valve area 2.4 cm²         AV mean gradient 3 mmHg         AV peak gradient 6 mmHg         AV Velocity Ratio 0.83         AV index (prosthetic) 0.78          Regurgitation   AV regurgitation pressure 1/2 time 1,301 ms          PISA   AR Max Aliya 3.76 m/s               Doppler Measurements - Mitral Valve    Stenosis   MV mean gradient 1 mmHg         MV peak gradient 3 mmHg         MV valve area by continuity eq 3.01 cm2         MV valve area p 1/2 method 4.21 cm2          PISA-MS   MV Peak E Aliya 0.62 m/s          PISA-MR   Mr max aliya 4.49 m/s                Doppler Measurements - Pulmonic Valve    Stenosis   Pulmonary Valve Mean Velocity 0.52 m/s         PV peak gradient 1 mmHg         PV PEAK VELOCITY 0.61 m/s               Doppler Measurements - Shunt Ratio    Shunt Ratio "   LVOT stroke volume 51.2 cm3              Doppler Measurements - Tricuspid Valve    Stress Evaluation   TV resting pulmonary artery pressure 28 mmHg                 Medications  (Filter: Administrations occurring from 0000 to 1215 on 04/07/25)  None     Signed    Electronically signed by Thanh Hansen MD on 4/7/25 at 1312 CDT     US Lower Extremity Veins Left  Order: [id number]   Status: Final result       Next appt: None       Dx: Left leg swelling    Test Result Released: Yes (not seen)    0 Result Notes  Details    Reading Physician Reading Date Result Priority   Dangelo Gomez, DO  [phone]  4/6/2025 STAT     Narrative & Impression  EXAMINATION:  US LOWER EXTREMITY VEINS LEFT     CLINICAL HISTORY:  Other specified soft tissue disorders     TECHNIQUE:  Duplex and color flow Doppler evaluation and graded compression of the left lower extremity veins was performed.     COMPARISON:  None     FINDINGS:  Left thigh veins: The common femoral, femoral, popliteal, upper greater saphenous, and deep femoral veins are patent and free of thrombus. The veins are normally compressible and have normal phasic flow and augmentation response.     Left calf veins: The visualized calf veins are patent.     Contralateral CFV: The contralateral (right) common femoral vein is patent and free of thrombus.     Miscellaneous: None     Impression:     No evidence of deep venous thrombosis in the left lower extremity.        Electronically signed by:Dangelo Gomez  Date:                                            04/06/2025  Time:                                           18:19        Exam Ended: 04/06/25 18:10 CDT Last Resulted: 04/06/25 18:19 CDT     X-Ray Chest AP Portable  Order: [id number]   Status: Final result       Next appt: None       Dx: Bilateral lower extremity edema    Test Result Released: Yes (not seen)    0 Result Notes  Details    Reading Physician Reading Date Result Priority   Freddie Ramos,  MD  [phone]  4/6/2025 STAT     Narrative & Impression  EXAMINATION:  XR CHEST AP PORTABLE     CLINICAL HISTORY:  Shortness of breath     TECHNIQUE:  Single frontal portable view of the chest was performed.     COMPARISON:  08/07/2020     FINDINGS:  Left chest wall dual lead cardiac pacemaker.  Stable enlarged cardiac silhouette.  Aortic calcification.  Central pulmonary vascular congestion.  No focal airspace consolidation, pleural effusion, or pneumothorax.  No acute bony abnormality identified.     Impression:     As above.        Electronically signed by:Freddie Ramos  Date:                                            04/06/2025  Time:                                           16:47        Exam Ended: 04/06/25 16:10 CDT Last Resulted: 04/06/25 16:47 CDT           Assessment & Plan  Pacemaker  Ventricular paced rhythm  -Cardiac monitoring    Hypertension  Patient's blood pressure range in the last 24 hours was: BP  Min: 99/56  Max: 156/72.The patient's inpatient anti-hypertensive regimen is listed below:  Current Antihypertensives  furosemide injection 40 mg, Daily, Intravenous  lisinopriL tablet 20 mg, Daily, Oral  hydroCHLOROthiazide tablet 12.5 mg, Daily, Oral  amLODIPine tablet 10 mg, Daily, Oral  metoprolol tartrate (LOPRESSOR) tablet 100 mg, 2 times daily, Oral    Plan  Currently normotensive. Home medications not reordered at this time  Hyperlipidemia   Patient is chronically on statin.will continue for now. Monitor clinically. Last LDL was   Lab Results   Component Value Date    LDLCALC 63.0 08/08/2020          New onset of congestive heart failure  Patient has  unknown  heart failure that is Acute. On presentation their CHF was decompensated. Evidence of decompensated CHF on presentation includes: edema. The etiology of their decompensation is likely dietary indiscretion and medication non-compliance. Most recent BNP and echo results are listed below.  Recent Labs     04/06/25  1716   BNP 1,434*      Latest ECHO  Results for orders placed during the hospital encounter of 04/06/25    Echo    Interpretation Summary    Left Ventricle: The left ventricle is normal in size. Normal wall thickness. Normal wall motion. There is normal systolic function with a visually estimated ejection fraction of 55 - 60%. There is normal diastolic function.    Right Ventricle: The right ventricle is normal in size. Wall thickness is normal. Systolic function is normal.    Tricuspid Valve: There is mild regurgitation.    Pulmonary Artery: The estimated pulmonary artery systolic pressure is 28 mmHg.    IVC/SVC: Normal venous pressure at 3 mmHg.    Current Heart Failure Medications  furosemide injection 40 mg, Daily, Intravenous  lisinopriL tablet 20 mg, Daily, Oral  hydroCHLOROthiazide tablet 12.5 mg, Daily, Oral    Plan  - Monitor strict I&Os and daily weights.    - Place on telemetry  - Low sodium diet  - Place on fluid restriction of 1.5 L.   - Cardiology has been consulted  - The patient's volume status is stable but not at their baseline as indicated by edema  VTE Risk Mitigation (From admission, onward)           Ordered     enoxaparin injection 40 mg  Daily         04/06/25 2005     IP VTE HIGH RISK PATIENT  Once         04/06/25 2005     Place sequential compression device  Until discontinued         04/06/25 2005                    Discharge Planning   WILLOW: 4/9/2025     Code Status: Full Code   Medical Readiness for Discharge Date:   Discharge Plan A: Home with family                        Caroline Ge DNP  Department of Hospital Medicine   [facility]/Surg

## 2025-04-08 NOTE — CARE UPDATE
04/07/25 1953   Patient Assessment/Suction   Level of Consciousness (AVPU) alert   Respiratory Effort Normal;Unlabored   Expansion/Accessory Muscles/Retractions expansion symmetric;no retractions;no use of accessory muscles   All Lung Fields Breath Sounds Anterior:;Lateral:;diminished   PRE-TX-O2   Device (Oxygen Therapy) room air   SpO2 98 %   Pulse Oximetry Type Intermittent   $ Pulse Oximetry - Multiple Charge Pulse Oximetry - Multiple   Pulse 65   Resp 16   Education   $ Education Other (see comment);15 min  (pulse oximetry)

## 2025-04-08 NOTE — CARE UPDATE
04/08/25 0832   Patient Assessment/Suction   Level of Consciousness (AVPU) alert   PRE-TX-O2   Device (Oxygen Therapy) room air   SpO2 96 %   Pulse Oximetry Type Intermittent   $ Pulse Oximetry - Multiple Charge Pulse Oximetry - Multiple

## 2025-04-08 NOTE — ASSESSMENT & PLAN NOTE
Patient's blood pressure range in the last 24 hours was: BP  Min: 99/56  Max: 156/72.The patient's inpatient anti-hypertensive regimen is listed below:  Current Antihypertensives  furosemide injection 40 mg, Daily, Intravenous  lisinopriL tablet 20 mg, Daily, Oral  hydroCHLOROthiazide tablet 12.5 mg, Daily, Oral  amLODIPine tablet 10 mg, Daily, Oral  metoprolol tartrate (LOPRESSOR) tablet 100 mg, 2 times daily, Oral    Plan  Currently normotensive. Home medications not reordered at this time

## 2025-04-09 PROBLEM — N17.9 AKI (ACUTE KIDNEY INJURY): Status: ACTIVE | Noted: 2025-04-09

## 2025-04-09 PROBLEM — R60.0 BILATERAL LOWER EXTREMITY EDEMA: Status: ACTIVE | Noted: 2025-04-09

## 2025-04-09 LAB
ANION GAP (SMH): 12 MMOL/L (ref 8–16)
BUN SERPL-MCNC: 42 MG/DL (ref 8–23)
CALCIUM SERPL-MCNC: 9.5 MG/DL (ref 8.7–10.5)
CHLORIDE SERPL-SCNC: 96 MMOL/L (ref 95–110)
CO2 SERPL-SCNC: 29 MMOL/L (ref 23–29)
CREAT SERPL-MCNC: 2.1 MG/DL (ref 0.5–1.4)
ERYTHROCYTE [DISTWIDTH] IN BLOOD BY AUTOMATED COUNT: 14.6 % (ref 11.5–14.5)
GFR SERPLBLD CREATININE-BSD FMLA CKD-EPI: 24 ML/MIN/1.73/M2
GLUCOSE SERPL-MCNC: 118 MG/DL (ref 70–110)
HCT VFR BLD AUTO: 41.5 % (ref 37–48.5)
HGB BLD-MCNC: 13.2 GM/DL (ref 12–16)
MCH RBC QN AUTO: 27.7 PG (ref 27–31)
MCHC RBC AUTO-ENTMCNC: 31.8 G/DL (ref 32–36)
MCV RBC AUTO: 87 FL (ref 82–98)
PLATELET # BLD AUTO: 268 K/UL (ref 150–450)
PMV BLD AUTO: 10.6 FL (ref 9.2–12.9)
POTASSIUM SERPL-SCNC: 4.2 MMOL/L (ref 3.5–5.1)
RBC # BLD AUTO: 4.77 M/UL (ref 4–5.4)
SODIUM SERPL-SCNC: 137 MMOL/L (ref 136–145)
WBC # BLD AUTO: 5.62 K/UL (ref 3.9–12.7)

## 2025-04-09 PROCEDURE — 99223 1ST HOSP IP/OBS HIGH 75: CPT | Mod: ,,, | Performed by: STUDENT IN AN ORGANIZED HEALTH CARE EDUCATION/TRAINING PROGRAM

## 2025-04-09 PROCEDURE — 85027 COMPLETE CBC AUTOMATED: CPT

## 2025-04-09 PROCEDURE — 11000001 HC ACUTE MED/SURG PRIVATE ROOM

## 2025-04-09 PROCEDURE — 25000003 PHARM REV CODE 250

## 2025-04-09 PROCEDURE — 82310 ASSAY OF CALCIUM: CPT

## 2025-04-09 PROCEDURE — 36415 COLL VENOUS BLD VENIPUNCTURE: CPT

## 2025-04-09 PROCEDURE — 94761 N-INVAS EAR/PLS OXIMETRY MLT: CPT

## 2025-04-09 PROCEDURE — 99900031 HC PATIENT EDUCATION (STAT)

## 2025-04-09 PROCEDURE — 63600175 PHARM REV CODE 636 W HCPCS

## 2025-04-09 RX ORDER — ENOXAPARIN SODIUM 100 MG/ML
30 INJECTION SUBCUTANEOUS EVERY 24 HOURS
Status: DISCONTINUED | OUTPATIENT
Start: 2025-04-09 | End: 2025-04-10 | Stop reason: HOSPADM

## 2025-04-09 RX ADMIN — ASPIRIN 81 MG CHEWABLE TABLET 81 MG: 81 TABLET CHEWABLE at 09:04

## 2025-04-09 RX ADMIN — ENOXAPARIN SODIUM 30 MG: 30 INJECTION SUBCUTANEOUS at 04:04

## 2025-04-09 RX ADMIN — CETIRIZINE HYDROCHLORIDE 10 MG: 10 TABLET, FILM COATED ORAL at 09:04

## 2025-04-09 RX ADMIN — ATORVASTATIN CALCIUM 10 MG: 10 TABLET, FILM COATED ORAL at 09:04

## 2025-04-09 NOTE — CONSULTS
"[facility]/[place]  Department of Cardiology  Consult Note      PATIENT NAME: Alayna Bergman    MRN: [id number]  TODAY'S DATE: 04/09/2025  ADMIT DATE: 4/6/2025                          CONSULT REQUESTED BY: Franc Donohue MD    SUBJECTIVE     PRINCIPAL PROBLEM: New onset of congestive heart failure      REASON FOR CONSULT:  Suspected CHF      HPI:  74-year old female with medical history listed as below print in the hospital for complaints of bilateral leg swelling.  She has had it intermittently for a while.  He decided to come to the hospital as this time it was worse than the other times.  The swelling progresses throughout the day while she is on her feet in improves at night with leg elevation.  She denies any shortness of breath, orthopnea, PND, chest pain, palpitations, syncope or near-syncope.  Denies any abdominal bloating or swelling.      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 Patient reports increased leg swelling for about two weeks but significantly worse over one week. She denies any shortness of breath or increased pillow usage at night to sleep. Reports she sleeps propped up but could lay flat with no issue to sleep. Patient is followed by Dr. Collier in [facility] and last note from 10/10/24 in care everywhere. In this note it reports that she is on HCTZ but then it is not listed in her med list. Patient reports she does not take a daily diuretic and the only time she ever took a "fluid pill" was when they prescribed her three days of furosemide after visiting urgent care for leg swelling related to taking PO minoxidil. Initial ED labs showed a BNP of 1,434 and chest xray showed central pulmonary vascular congestion. CBC normal. CMP showed creatinine of 1.2 with GFR of " "48. TSH normal. EKG shows a ventricular paced rhythm. Last Echo from  showed EF of 62%. Patient does report some dietary indiscretion and increased salty foods over the last two weeks. Venous US of left lower extremity negative for DVT. Patient administered 40mg of lasix in ED and admitted by hospital medicine for further evaluation and management. "    Review of patient's allergies indicates:  No Known Allergies    Past Medical History:   Diagnosis Date    Hyperlipemia     Hypertension      Past Surgical History:   Procedure Laterality Date     SECTION      INSERTION OF PACEMAKER       Social History[1]     REVIEW OF SYSTEMS  CONSTITUTIONAL: Negative for chills, fatigue and fever.   EYES: No double vision,   NEURO: No headaches, No dizziness  RESPIRATORY: Negative for cough, shortness of breath and wheezing.    CARDIOVASCULAR: Negative for chest pain. Negative for palpitations and leg swelling.   GI: Negative for abdominal pain, No melena, diarrhea, nausea and vomiting.   : Negative for dysuria and frequency, Negative for hematuria  SKIN: Negative for bruising   ENDOCRINE: Negative for polyphagia, Negative for heat intolerance, Negative for cold intolerance  PSYCHIATRIC: Negative for depression, Negative for anxiety, Negative for memory loss  MUSCULOSKELETAL: Negative for neck pain, Negative for muscle weakness, Negative for back pain     OBJECTIVE     VITAL SIGNS (Most Recent)  Temp: 97.9 °F (36.6 °C) (25)  Pulse: 67 (25)  Resp: 18 (25)  BP: (!) 106/56 (25)  SpO2: 97 % (25)    VENTILATION STATUS  Resp: 18 (25)  SpO2: 97 % (25)           I & O (Last 24H):  Intake/Output Summary (Last 24 hours) at 2025 0901  Last data filed at 2025 0342  Gross per 24 hour   Intake 720 ml   Output 1950 ml   Net -1230 ml       WEIGHTS  Wt Readings from Last 1 Encounters:   25 1059 73.5 kg (162 lb)   25 2144 73.8 kg (162 lb " 11.2 oz)   04/06/25 1554 69.4 kg (153 lb)       PHYSICAL EXAM  GENERAL: in no apparent distress alert and oriented.   HEENT: Normocephalic. Pupils normal and conjunctivae normal.   NECK: No JVD.  CARDIAC: Regular rate and rhythm. S1 is normal.S2 is normal. No murmurs noted at this time.  LUNGS: Clear to auscultation. No wheezing or rhonchi..   ABDOMEN: Soft No abdomen pulsations or bruits.  Normal bowel sounds. No guarding or rebound.   EXTREMITIES: 1+ LE edema, No cyanosis, clubbin, no calf tenderness bilaterally.   PERIPHERAL VASCULAR SYSTEM: Good palpable distal pulses.   CENTRAL NERVOUS SYSTEM: No focal motor or sensory deficits noted.   SKIN: Skin without lesions, moist, well perfused.   MUSCLE STRENGTH & TONE: No noteable weakness, atrophy or abnormal movement.     HOME MEDICATIONS:Medications Ordered Prior to Encounter[2]    SCHEDULED MEDS:   aspirin  81 mg Oral Daily    atorvastatin  10 mg Oral Daily    cetirizine  10 mg Oral QAM    enoxparin  30 mg Subcutaneous Daily    metoprolol tartrate  100 mg Oral BID       CONTINUOUS INFUSIONS:    PRN MEDS:  Current Facility-Administered Medications:     acetaminophen, 650 mg, Oral, Q8H PRN    magnesium oxide, 800 mg, Oral, PRN    magnesium oxide, 800 mg, Oral, PRN    ondansetron, 4 mg, Intravenous, Q8H PRN    potassium bicarbonate, 35 mEq, Oral, PRN    potassium bicarbonate, 50 mEq, Oral, PRN    potassium bicarbonate, 60 mEq, Oral, PRN    potassium, sodium phosphates, 2 packet, Oral, PRN    potassium, sodium phosphates, 2 packet, Oral, PRN    potassium, sodium phosphates, 2 packet, Oral, PRN    prochlorperazine, 5 mg, Intravenous, Q6H PRN    sodium chloride 0.9%, 10 mL, Intravenous, PRN    LABS AND DIAGNOSTICS     CBC LAST 3 DAYS  Recent Labs   Lab 04/06/25  1716 04/07/25  0355 04/08/25  0349 04/09/25  0343   WBC 4.70 4.25 4.17 5.62   RBC 4.37 4.56 4.63 4.77   HGB 12.4 12.4 12.8 13.2   HCT 38.8 39.4 40.4 41.5   MCV 89 86 87 87   MCH 28.4 27.2 27.6 27.7   MCHC 32.0  "31.5* 31.7* 31.8*   RDW 14.6* 14.6* 14.6* 14.6*    244 238 268   MPV 10.3 10.5 10.2 10.6   NRBC 0  --   --   --        COAGULATION LAST 3 DAYS  No results for input(s): "LABPT", "INR", "APTT" in the last 168 hours.    CHEMISTRY LAST 3 DAYS  Recent Labs   Lab 04/06/25  1716 04/07/25  0355 04/08/25  0400 04/09/25  0343    144 139 137   K 3.8 4.6 4.7 4.2   CO2 25 29 29 29   BUN 21 21 30* 42*   CREATININE 1.2 1.3 1.5* 2.1*   CALCIUM 9.2 9.6 9.6 9.5   MG  --  2.2  --   --    ALBUMIN 4.5  --   --   --    ALKPHOS 101  --   --   --    ALT 34  --   --   --    AST 38  --   --   --    BILITOT 0.4  --   --   --        CARDIAC PROFILE LAST 3 DAYS  Recent Labs   Lab 04/06/25  1716   BNP 1,434*       ENDOCRINE LAST 3 DAYS  Recent Labs   Lab 04/06/25  2009   TSH 0.749       LAST ARTERIAL BLOOD GAS  ABG  No results for input(s): "PH", "PO2", "PCO2", "HCO3", "BE" in the last 168 hours.    LAST 7 DAYS MICROBIOLOGY   Microbiology Results (last 7 days)       ** No results found for the last 168 hours. **            MOST RECENT IMAGING  Echo    Left Ventricle: The left ventricle is normal in size. Normal wall   thickness. Normal wall motion. There is normal systolic function with a   visually estimated ejection fraction of 55 - 60%. There is normal   diastolic function.    Right Ventricle: The right ventricle is normal in size. Wall thickness   is normal. Systolic function is normal.    Tricuspid Valve: There is mild regurgitation.    Pulmonary Artery: The estimated pulmonary artery systolic pressure is   28 mmHg.    IVC/SVC: Normal venous pressure at 3 mmHg.      ECHOCARDIOGRAM RESULTS (last 5)  Results for orders placed during the hospital encounter of 04/06/25    Echo    Interpretation Summary    Left Ventricle: The left ventricle is normal in size. Normal wall thickness. Normal wall motion. There is normal systolic function with a visually estimated ejection fraction of 55 - 60%. There is normal diastolic function.    " Right Ventricle: The right ventricle is normal in size. Wall thickness is normal. Systolic function is normal.    Tricuspid Valve: There is mild regurgitation.    Pulmonary Artery: The estimated pulmonary artery systolic pressure is 28 mmHg.    IVC/SVC: Normal venous pressure at 3 mmHg.      Results for orders placed during the hospital encounter of 08/07/20    Echo Color Flow Doppler? Yes; Bubble Contrast? Yes    Interpretation Summary  · There is no evidence of intracardiac shunting.  · Normal left ventricular systolic function. The estimated ejection fraction is 62%.  · Normal LV diastolic function.  · Concentric left ventricular remodeling.  · Normal right ventricular systolic function.  · Mild right atrial enlargement.  · Mild aortic regurgitation.  · Normal central venous pressure (3 mmHg).  · The estimated PA systolic pressure is 23 mmHg.  · Small pericardial effusion.  · Mild left atrial enlargement.      CURRENT/PREVIOUS VISIT EKG  Results for orders placed or performed during the hospital encounter of 04/06/25   EKG 12-lead    Collection Time: 04/06/25  5:16 PM   Result Value Ref Range    QRS Duration 186 ms    [facility] QTC Calculation 497 ms    Narrative    Test Reason : R06.02,    Vent. Rate :  65 BPM     Atrial Rate :  67 BPM     P-R Int :    ms          QRS Dur : 186 ms      QT Int : 478 ms       P-R-T Axes :    -71  91 degrees    QTcB Int : 497 ms    Ventricular-paced rhythm  Abnormal ECG  When compared with ECG of 13-Oct-2022 13:58,  Vent. rate has increased by   4 bpm  Confirmed by Thanh Hansen ([id number]) on 4/7/2025 3:48:23 PM    Referred By: AAAREFERRAL SELF           Confirmed By: Thanh Hansen           ASSESSMENT/PLAN:     Active Hospital Problems    Diagnosis    *New onset of congestive heart failure    Pacemaker    Hypertension    Hyperlipidemia       ASSESSMENT & PLAN:   B/l LE edema, mostly secondary to venous insufficiency   SERGEY - diuretic induced. Possibly has some underlying CKD as well --  gfr 48 on initial presentation - no recent baseline available.   H/o of tachybrady syndrome, s/p PPM --- V paced on tele  HTN       She has no other indication of volume overload from CHF( BECK, orthopnea, PND, weight gain) besides the LE edema. LVEF is preserved with no WMA, normal diastolic function and no high grade valvular abnormalities on echo. Isolated right sided HF unlikely with normal RV size and function on echo. No high grade TR on echo either. IVC was not dilated and collapsible. Estimated pulmonary artery systolic pressure not elevated. She says the LE edema gets better with leg elevation and gradually comes on throughout the day when she is on her feet. This is also not new. She can have some diastolic dysfunction and is at risk of CHF but this presentation appears mostly due to venous insufficiency. The fact that her gfr decreased as much with IV lasix and her BP is on the softer side also suggest she was not significantly volume overloaded.   NTBNP cut off for her age is 900. It can be further elevated if there is underlying CKD.     RECOMMENDATIONS:  Hold diuretic. Hold ACE-I and HCTZ until SERGEY starts getting better.   She is at risk of CHF. She can be discharged on PRN Lasix 20 for weight gain >3lb within 24 hours or >5lb within a week. At home,  Restrict daily Na intake <2g and fluid intake <2L.  Compression stockings for venous insufficiency and leg elevation.  Follow up with primary cardiologist outpatient.      Jena Ruiz MD  Date of Service: 04/09/2025  9:01 AM          [1]   Social History  Tobacco Use    Smoking status: Never    Smokeless tobacco: Never   Substance Use Topics    Alcohol use: Not Currently     Comment: Very rare    Drug use: Never   [2]   No current facility-administered medications on file prior to encounter.     Current Outpatient Medications on File Prior to Encounter   Medication Sig Dispense Refill    amLODIPine (NORVASC) 10 MG tablet Take 10 mg by mouth once  daily.      aspirin 81 MG Chew Take 81 mg by mouth once daily.      atorvastatin (LIPITOR) 10 MG tablet Take 10 mg by mouth once daily.      cetirizine (ZYRTEC) 10 MG tablet Take 1 tablet by mouth every morning.      lisinopriL (PRINIVIL,ZESTRIL) 20 MG tablet Take 20 mg by mouth once daily.      metoprolol tartrate (LOPRESSOR) 100 MG tablet Take 100 mg by mouth 2 (two) times daily.      pseudoephedrine (SUDAFED) 30 MG tablet Take 30 mg by mouth every 4 (four) hours as needed for Congestion.

## 2025-04-09 NOTE — PHYSICIAN QUERY
Please specify the diagnosis associated with the clinical findings.     Acute kidney failure/injury on Chronic kidney disease (CKD) stage 3a

## 2025-04-09 NOTE — PHYSICIAN QUERY
Please specify the type of congestive heart failure associated with the clinical findings.  Other (please specify): no CHF on TTE

## 2025-04-09 NOTE — SUBJECTIVE & OBJECTIVE
Interval History: Patient seen and examined at bedside.  NAD.  Denies chest pain or shortness of breath.  She reports significant improvement in lower extremity edema with diuresis.    SrCr 2.1 today.  Will hold diuretics and lisinopril, repeat labs in AM    Review of Systems   Respiratory:  Negative for shortness of breath.    Cardiovascular:  Positive for leg swelling (improved). Negative for chest pain.   Gastrointestinal:  Negative for abdominal pain and nausea.     Objective:     Vital Signs (Most Recent):  Temp: 97.7 °F (36.5 °C) (04/09/25 1130)  Pulse: 65 (04/09/25 1130)  Resp: 18 (04/09/25 1130)  BP: 121/70 (04/09/25 1130)  SpO2: 98 % (04/09/25 1130) Vital Signs (24h Range):  Temp:  [97.7 °F (36.5 °C)-98.3 °F (36.8 °C)] 97.7 °F (36.5 °C)  Pulse:  [64-67] 65  Resp:  [16-20] 18  SpO2:  [96 %-100 %] 98 %  BP: ()/(56-76) 121/70     Weight: 73.5 kg (162 lb)  Body mass index is 26.96 kg/m².    Intake/Output Summary (Last 24 hours) at 4/9/2025 1221  Last data filed at 4/9/2025 0342  Gross per 24 hour   Intake 480 ml   Output 650 ml   Net -170 ml         Physical Exam  Vitals and nursing note reviewed.   Constitutional:       General: She is not in acute distress.  Cardiovascular:      Rate and Rhythm: Normal rate and regular rhythm.   Pulmonary:      Effort: Pulmonary effort is normal. No respiratory distress.      Breath sounds: Normal breath sounds.   Abdominal:      Palpations: Abdomen is soft.      Tenderness: There is no abdominal tenderness.   Musculoskeletal:      Right lower leg: Edema (mild) present.      Left lower leg: Edema (mild) present.   Skin:     General: Skin is warm and dry.   Neurological:      Mental Status: She is alert and oriented to person, place, and time.   Psychiatric:         Mood and Affect: Mood normal.               Significant Labs: All pertinent labs within the past 24 hours have been reviewed.  CBC:   Recent Labs   Lab 04/08/25  0349 04/09/25  0343   WBC 4.17 5.62   HGB 12.8  "13.2   HCT 40.4 41.5    268     CMP:   Recent Labs   Lab 04/08/25  0400 04/09/25  0343    137   K 4.7 4.2   CO2 29 29   BUN 30* 42*   CREATININE 1.5* 2.1*   CALCIUM 9.6 9.5     Magnesium: No results for input(s): "MG" in the last 48 hours.    Significant Imaging: I have reviewed all pertinent imaging results/findings within the past 24 hours.  "

## 2025-04-09 NOTE — PROGRESS NOTES
Pharmacist Renal Dose Adjustment Note    Alayna Bergman is a 74 y.o. female being treated with the medication lovenox    Patient Data:    Vital Signs (Most Recent):  Temp: 97.9 °F (36.6 °C) (04/09/25 0337)  Pulse: 65 (04/09/25 0337)  Resp: 18 (04/09/25 0337)  BP: (!) 103/56 (04/09/25 0337)  SpO2: 96 % (04/09/25 0337) Vital Signs (72h Range):  Temp:  [97.6 °F (36.4 °C)-98.3 °F (36.8 °C)]   Pulse:  [64-68]   Resp:  [14-20]   BP: ()/(56-76)   SpO2:  [95 %-100 %]      Recent Labs   Lab 04/07/25  0355 04/08/25  0400 04/09/25  0343   CREATININE 1.3 1.5* 2.1*     Serum creatinine: 2.1 mg/dL (H) 04/09/25 0343  Estimated creatinine clearance: 24 mL/min (A)    Medication:lovenox dose: 40mg frequency qd will be changed to medication:lovenox dose:30mg frequency:qd    Pharmacist's Name: Colin Ellis  Pharmacist's Extension: [phone]

## 2025-04-09 NOTE — PROGRESS NOTES
"[facility] Medicine  Progress Note    Patient Name: Alayna Bergman  MRN: [id number]  Patient Class: IP- Inpatient   Admission Date: 4/6/2025  Length of Stay: 2 days  Attending Physician: Franc Donohue MD  Primary Care Provider: Todd Noel MD        Subjective     Principal Problem:New onset of congestive heart failure        HPI:  Alayna Bergman is a 74 year old female with a previous medical history of Hypertension, Hyperlipidemia, Supraventricular arrythmia 3rd degree heart block with pacemaker in place who presented to the ED for leg swelling. Patient reports increased leg swelling for about two weeks but significantly worse over one week. She denies any shortness of breath or increased pillow usage at night to sleep. Reports she sleeps propped up but could lay flat with no issue to sleep. Patient is followed by Dr. Collier in [facility] and last note from 10/10/24 in care everywhere. In this note it reports that she is on HCTZ but then it is not listed in her med list. Patient reports she does not take a daily diuretic and the only time she ever took a "fluid pill" was when they prescribed her three days of furosemide after visiting urgent care for leg swelling related to taking PO minoxidil. Initial ED labs showed a BNP of 1,434 and chest xray showed central pulmonary vascular congestion. CBC normal. CMP showed creatinine of 1.2 with GFR of 48. TSH normal. EKG shows a ventricular paced rhythm. Last Echo from 2020 showed EF of 62%. Patient does report some dietary indiscretion and increased salty foods over the last two weeks. Venous US of left lower extremity negative for DVT. Patient administered 40mg of lasix in ED and admitted by hospital medicine for further evaluation and management.     Overview/Hospital Course:  Alayna Bergman was closely monitored while in the hospital.  Patient was admitted to Hospital Medicine for new onset of congestive heart failure.  Patient " was started on IV Lasix 40 mg daily.  Echocardiogram with normal systolic function, EF 55 - 60% with normal diastolic function, Mild tricuspid regurgitation, PAS 28 mmHg and IVC 3 mmHg.  BNP on admission was 1400.  LE US negative for DVT.  Patient endorses she had recently had an increase in salt intake after attending multiple fish and crawfish boils.  Registered dietitian consulted for education on low-sodium diet.  Patient is prescribed hydrochlorothiazide 12.5 combination with lisinopril, that she states that she has not been taking.  Patient thoroughly educated at the bedside on the need to decrease salt intake and to take all medications as prescribed.  Patient developed SERGEY; diuretics held.         Interval History: Patient seen and examined at bedside.  NAD.  Denies chest pain or shortness of breath.  She reports significant improvement in lower extremity edema with diuresis.    SrCr 2.1 today.  Will hold diuretics and lisinopril, repeat labs in AM    Review of Systems   Respiratory:  Negative for shortness of breath.    Cardiovascular:  Positive for leg swelling (improved). Negative for chest pain.   Gastrointestinal:  Negative for abdominal pain and nausea.     Objective:     Vital Signs (Most Recent):  Temp: 97.7 °F (36.5 °C) (04/09/25 1130)  Pulse: 65 (04/09/25 1130)  Resp: 18 (04/09/25 1130)  BP: 121/70 (04/09/25 1130)  SpO2: 98 % (04/09/25 1130) Vital Signs (24h Range):  Temp:  [97.7 °F (36.5 °C)-98.3 °F (36.8 °C)] 97.7 °F (36.5 °C)  Pulse:  [64-67] 65  Resp:  [16-20] 18  SpO2:  [96 %-100 %] 98 %  BP: ()/(56-76) 121/70     Weight: 73.5 kg (162 lb)  Body mass index is 26.96 kg/m².    Intake/Output Summary (Last 24 hours) at 4/9/2025 1221  Last data filed at 4/9/2025 0342  Gross per 24 hour   Intake 480 ml   Output 650 ml   Net -170 ml         Physical Exam  Vitals and nursing note reviewed.   Constitutional:       General: She is not in acute distress.  Cardiovascular:      Rate and Rhythm: Normal  "rate and regular rhythm.   Pulmonary:      Effort: Pulmonary effort is normal. No respiratory distress.      Breath sounds: Normal breath sounds.   Abdominal:      Palpations: Abdomen is soft.      Tenderness: There is no abdominal tenderness.   Musculoskeletal:      Right lower leg: Edema (mild) present.      Left lower leg: Edema (mild) present.   Skin:     General: Skin is warm and dry.   Neurological:      Mental Status: She is alert and oriented to person, place, and time.   Psychiatric:         Mood and Affect: Mood normal.               Significant Labs: All pertinent labs within the past 24 hours have been reviewed.  CBC:   Recent Labs   Lab 04/08/25  0349 04/09/25  0343   WBC 4.17 5.62   HGB 12.8 13.2   HCT 40.4 41.5    268     CMP:   Recent Labs   Lab 04/08/25  0400 04/09/25  0343    137   K 4.7 4.2   CO2 29 29   BUN 30* 42*   CREATININE 1.5* 2.1*   CALCIUM 9.6 9.5     Magnesium: No results for input(s): "MG" in the last 48 hours.    Significant Imaging: I have reviewed all pertinent imaging results/findings within the past 24 hours.      Assessment & Plan  New onset of congestive heart failure  Hypertension  Patient's blood pressure range in the last 24 hours was: BP  Min: 99/56  Max: 121/70.The patient's inpatient anti-hypertensive regimen is listed below:  Current Antihypertensives  metoprolol tartrate (LOPRESSOR) tablet 100 mg, 2 times daily, Oral    Plan  Currently normotensive  Hold home amlodipine as this may be contributing to lower extremity edema   Hold lisinopril for SERGEY    Patient has unknown heart failure that is Acute. On presentation their CHF was decompensated. Evidence of decompensated CHF on presentation includes: edema. The etiology of their decompensation is likely dietary indiscretion and medication non-compliance. Most recent BNP and echo results are listed below.  Recent Labs     04/06/25  1716   BNP 1,434*     Latest ECHO  Results for orders placed during the hospital " encounter of 04/06/25    Echo    Interpretation Summary    Left Ventricle: The left ventricle is normal in size. Normal wall thickness. Normal wall motion. There is normal systolic function with a visually estimated ejection fraction of 55 - 60%. There is normal diastolic function.    Right Ventricle: The right ventricle is normal in size. Wall thickness is normal. Systolic function is normal.    Tricuspid Valve: There is mild regurgitation.    Pulmonary Artery: The estimated pulmonary artery systolic pressure is 28 mmHg.    IVC/SVC: Normal venous pressure at 3 mmHg.    Current Heart Failure Medications       Plan  - Monitor strict I&Os and daily weights.    - Place on telemetry  - Low sodium diet  - Place on fluid restriction of 1.5 L.   - Cardiology has been consulted  - The patient's volume status is improving but not at their baseline as indicated by edema  - Hold diuresis 4/9 due to SERGEY  SERGEY (acute kidney injury)  SERGEY is likely due to pre-renal azotemia due to intravascular volume depletion. Baseline creatinine is 1.3. Most recent creatinine and eGFR are listed below.  Recent Labs     04/07/25  0355 04/08/25  0400 04/09/25  0343   CREATININE 1.3 1.5* 2.1*   EGFRNORACEVR 44* 37* 24*      Plan  - SERGEY is worsening. Will adjust treatment as follows: hold diuretics and ACE-I  - Avoid nephrotoxins and renally dose meds for GFR listed above  - Monitor urine output, serial BMP, and adjust therapy as needed  - Likely due to diuresis -- hold lasix/HCTZ  Pacemaker  -Ventricular paced rhythm  -Cardiac monitoring    Hyperlipidemia   Patient is chronically on statin.will continue for now. Monitor clinically. Last LDL was   Lab Results   Component Value Date    LDLCALC 63.0 08/08/2020          VTE Risk Mitigation (From admission, onward)           Ordered     enoxaparin injection 30 mg  Daily         04/09/25 0608     IP VTE HIGH RISK PATIENT  Once         04/06/25 2005     Place sequential compression device  Until  discontinued         04/06/25 2005                    Discharge Planning   WILLOW: 4/10/2025     Code Status: Full Code   Medical Readiness for Discharge Date:   Discharge Plan A: Home with family                        Linda Garcia NP  Department of Hospital Medicine   [facility]

## 2025-04-09 NOTE — ASSESSMENT & PLAN NOTE
Patient's blood pressure range in the last 24 hours was: BP  Min: 99/56  Max: 121/70.The patient's inpatient anti-hypertensive regimen is listed below:  Current Antihypertensives  metoprolol tartrate (LOPRESSOR) tablet 100 mg, 2 times daily, Oral    Plan  Currently normotensive  Hold home amlodipine as this may be contributing to lower extremity edema   Hold lisinopril for SERGEY    Patient has unknown heart failure that is Acute. On presentation their CHF was decompensated. Evidence of decompensated CHF on presentation includes: edema. The etiology of their decompensation is likely dietary indiscretion and medication non-compliance. Most recent BNP and echo results are listed below.  Recent Labs     04/06/25  1716   BNP 1,434*     Latest ECHO  Results for orders placed during the hospital encounter of 04/06/25    Echo    Interpretation Summary    Left Ventricle: The left ventricle is normal in size. Normal wall thickness. Normal wall motion. There is normal systolic function with a visually estimated ejection fraction of 55 - 60%. There is normal diastolic function.    Right Ventricle: The right ventricle is normal in size. Wall thickness is normal. Systolic function is normal.    Tricuspid Valve: There is mild regurgitation.    Pulmonary Artery: The estimated pulmonary artery systolic pressure is 28 mmHg.    IVC/SVC: Normal venous pressure at 3 mmHg.    Current Heart Failure Medications       Plan  - Monitor strict I&Os and daily weights.    - Place on telemetry  - Low sodium diet  - Place on fluid restriction of 1.5 L.   - Cardiology has been consulted  - The patient's volume status is improving but not at their baseline as indicated by edema  - Hold diuresis 4/9 due to SERGEY

## 2025-04-09 NOTE — CARE UPDATE
04/08/25 1936   Patient Assessment/Suction   Level of Consciousness (AVPU) alert   Respiratory Effort Normal;Unlabored   Expansion/Accessory Muscles/Retractions expansion symmetric   PRE-TX-O2   Device (Oxygen Therapy) room air   SpO2 96 %   Pulse Oximetry Type Intermittent   $ Pulse Oximetry - Multiple Charge Pulse Oximetry - Multiple   Pulse 65   Resp 16

## 2025-04-09 NOTE — PLAN OF CARE
Hospital follow up scheduled with PCP. Apt added to AVS (pt does not live locally, did not schedule with DC clinic for this reason)    Requested apt with pt's cardiologist. Office to contact pt for scheduling       04/09/25 3055   Post-Acute Status   Hospital Resources/Appts/Education Provided Appointments scheduled and added to AVS

## 2025-04-09 NOTE — PLAN OF CARE
Problem: Adult Inpatient Plan of Care  Goal: Plan of Care Review  Outcome: Progressing  Goal: Patient-Specific Goal (Individualized)  Outcome: Progressing  Flowsheets (Taken 4/9/2025 8157)  Individualized Care Needs: Safety, fluid restriction, strict I&O, mobility  Anxieties, Fears or Concerns: None stated  Patient/Family-Specific Goals (Include Timeframe): Pt will remain compliant with fluid restriction before discharge  Goal: Absence of Hospital-Acquired Illness or Injury  Outcome: Progressing  Goal: Optimal Comfort and Wellbeing  Outcome: Progressing  Goal: Readiness for Transition of Care  Outcome: Progressing     Problem: Heart Failure  Goal: Optimal Cardiac Output  Outcome: Progressing  Goal: Fluid and Electrolyte Balance  Outcome: Progressing  Goal: Effective Oxygenation and Ventilation  Outcome: Progressing      PT compliant with 1.5L fluid restriction and 2g salt diet. Pt signed bed alarm refusal form, pt family at bedside.   Pt bed locked in lowest position, call light within reach, and side rails up x2.

## 2025-04-09 NOTE — ASSESSMENT & PLAN NOTE
General SERGEY is likely due to pre-renal azotemia due to intravascular volume depletion. Baseline creatinine is 1.3. Most recent creatinine and eGFR are listed below.  Recent Labs     04/07/25  0355 04/08/25  0400 04/09/25  0343   CREATININE 1.3 1.5* 2.1*   EGFRNORACEVR 44* 37* 24*      Plan  - SERGEY is worsening. Will adjust treatment as follows: hold diuretics and ACE-I  - Avoid nephrotoxins and renally dose meds for GFR listed above  - Monitor urine output, serial BMP, and adjust therapy as needed  - Likely due to diuresis -- hold lasix/HCTZ

## 2025-04-09 NOTE — NURSING
Pt and pt family was informed of bed alarm safety measures. Pt refused bed alarm and signed bed alarm refusal form.

## 2025-04-09 NOTE — PLAN OF CARE
Problem: Adult Inpatient Plan of Care  Goal: Plan of Care Review  Outcome: Progressing  Goal: Patient-Specific Goal (Individualized)  Outcome: Progressing  Goal: Absence of Hospital-Acquired Illness or Injury  Outcome: Progressing  Goal: Optimal Comfort and Wellbeing  Outcome: Progressing  Goal: Readiness for Transition of Care  Outcome: Progressing     Problem: Wound  Goal: Optimal Coping  Outcome: Progressing  Goal: Optimal Functional Ability  Outcome: Progressing  Goal: Absence of Infection Signs and Symptoms  Outcome: Progressing  Goal: Improved Oral Intake  Outcome: Progressing  Goal: Optimal Pain Control and Function  Outcome: Progressing  Goal: Skin Health and Integrity  Outcome: Progressing  Goal: Optimal Wound Healing  Outcome: Progressing     Problem: Heart Failure  Goal: Optimal Coping  Outcome: Progressing  Goal: Optimal Cardiac Output  Outcome: Progressing  Goal: Stable Heart Rate and Rhythm  Outcome: Progressing  Goal: Optimal Functional Ability  Outcome: Progressing  Goal: Fluid and Electrolyte Balance  Outcome: Progressing  Goal: Improved Oral Intake  Outcome: Progressing  Goal: Effective Oxygenation and Ventilation  Outcome: Progressing  Goal: Effective Breathing Pattern During Sleep  Outcome: Progressing     Problem: Fall Injury Risk  Goal: Absence of Fall and Fall-Related Injury  Outcome: Progressing     Problem: Acute Kidney Injury/Impairment  Goal: Fluid and Electrolyte Balance  Outcome: Progressing  Goal: Improved Oral Intake  Outcome: Progressing  Goal: Effective Renal Function  Outcome: Progressing

## 2025-04-09 NOTE — PLAN OF CARE
04/09/25 0741   Patient Assessment/Suction   Level of Consciousness (AVPU) alert   Respiratory Effort Normal;Unlabored   Expansion/Accessory Muscles/Retractions no use of accessory muscles   Rhythm/Pattern, Respiratory pattern regular   Cough Frequency no cough   PRE-TX-O2   Device (Oxygen Therapy) room air   SpO2 97 %   Pulse Oximetry Type Intermittent   $ Pulse Oximetry - Multiple Charge Pulse Oximetry - Multiple   Pulse 67   Resp 18   General Safety Checklist   Safety Promotion/Fall Prevention side rails raised   Education   $ Education Other (see comment);15 min  (resp assess)

## 2025-04-10 VITALS
BODY MASS INDEX: 26.99 KG/M2 | SYSTOLIC BLOOD PRESSURE: 133 MMHG | HEART RATE: 65 BPM | RESPIRATION RATE: 18 BRPM | HEIGHT: 65 IN | WEIGHT: 162 LBS | DIASTOLIC BLOOD PRESSURE: 60 MMHG | OXYGEN SATURATION: 96 % | TEMPERATURE: 98 F

## 2025-04-10 PROBLEM — R60.0 BILATERAL LOWER EXTREMITY EDEMA: Status: RESOLVED | Noted: 2025-04-09 | Resolved: 2025-04-10

## 2025-04-10 PROBLEM — R60.0 BILATERAL LOWER EXTREMITY EDEMA: Status: ACTIVE | Noted: 2025-04-08

## 2025-04-10 LAB
ABSOLUTE EOSINOPHIL (SMH): 0.11 K/UL
ABSOLUTE MONOCYTE (SMH): 0.43 K/UL (ref 0.3–1)
ABSOLUTE NEUTROPHIL COUNT (SMH): 1.3 K/UL (ref 1.8–7.7)
ANION GAP (SMH): 10 MMOL/L (ref 8–16)
BASOPHILS # BLD AUTO: 0.05 K/UL
BASOPHILS NFR BLD AUTO: 1.4 %
BUN SERPL-MCNC: 39 MG/DL (ref 8–23)
CALCIUM SERPL-MCNC: 9.4 MG/DL (ref 8.7–10.5)
CHLORIDE SERPL-SCNC: 100 MMOL/L (ref 95–110)
CO2 SERPL-SCNC: 28 MMOL/L (ref 23–29)
CREAT SERPL-MCNC: 1.6 MG/DL (ref 0.5–1.4)
ERYTHROCYTE [DISTWIDTH] IN BLOOD BY AUTOMATED COUNT: 14.5 % (ref 11.5–14.5)
GFR SERPLBLD CREATININE-BSD FMLA CKD-EPI: 34 ML/MIN/1.73/M2
GLUCOSE SERPL-MCNC: 112 MG/DL (ref 70–110)
HCT VFR BLD AUTO: 39.2 % (ref 37–48.5)
HGB BLD-MCNC: 12.6 GM/DL (ref 12–16)
IMM GRANULOCYTES # BLD AUTO: 0 K/UL (ref 0–0.04)
IMM GRANULOCYTES NFR BLD AUTO: 0 % (ref 0–0.5)
LYMPHOCYTES # BLD AUTO: 1.67 K/UL (ref 1–4.8)
MAGNESIUM SERPL-MCNC: 2.4 MG/DL (ref 1.6–2.6)
MCH RBC QN AUTO: 27.6 PG (ref 27–31)
MCHC RBC AUTO-ENTMCNC: 32.1 G/DL (ref 32–36)
MCV RBC AUTO: 86 FL (ref 82–98)
NUCLEATED RBC (/100WBC) (SMH): 0 /100 WBC
PLATELET # BLD AUTO: 233 K/UL (ref 150–450)
PMV BLD AUTO: 10.4 FL (ref 9.2–12.9)
POTASSIUM SERPL-SCNC: 4.8 MMOL/L (ref 3.5–5.1)
RBC # BLD AUTO: 4.56 M/UL (ref 4–5.4)
RELATIVE EOSINOPHIL (SMH): 3.1 % (ref 0–8)
RELATIVE LYMPHOCYTE (SMH): 46.9 % (ref 18–48)
RELATIVE MONOCYTE (SMH): 12.1 % (ref 4–15)
RELATIVE NEUTROPHIL (SMH): 36.5 % (ref 38–73)
SODIUM SERPL-SCNC: 138 MMOL/L (ref 136–145)
WBC # BLD AUTO: 3.56 K/UL (ref 3.9–12.7)

## 2025-04-10 PROCEDURE — 36415 COLL VENOUS BLD VENIPUNCTURE: CPT

## 2025-04-10 PROCEDURE — 83735 ASSAY OF MAGNESIUM: CPT | Performed by: NURSE PRACTITIONER

## 2025-04-10 PROCEDURE — 25000003 PHARM REV CODE 250

## 2025-04-10 PROCEDURE — 80048 BASIC METABOLIC PNL TOTAL CA: CPT

## 2025-04-10 PROCEDURE — 85025 COMPLETE CBC W/AUTO DIFF WBC: CPT | Performed by: NURSE PRACTITIONER

## 2025-04-10 PROCEDURE — 36415 COLL VENOUS BLD VENIPUNCTURE: CPT | Performed by: NURSE PRACTITIONER

## 2025-04-10 RX ORDER — FUROSEMIDE 20 MG/1
20 TABLET ORAL DAILY PRN
Qty: 30 TABLET | Refills: 0 | Status: SHIPPED | OUTPATIENT
Start: 2025-04-10 | End: 2025-05-10

## 2025-04-10 RX ADMIN — CETIRIZINE HYDROCHLORIDE 10 MG: 10 TABLET, FILM COATED ORAL at 08:04

## 2025-04-10 RX ADMIN — ASPIRIN 81 MG CHEWABLE TABLET 81 MG: 81 TABLET CHEWABLE at 08:04

## 2025-04-10 RX ADMIN — ATORVASTATIN CALCIUM 10 MG: 10 TABLET, FILM COATED ORAL at 08:04

## 2025-04-10 NOTE — PLAN OF CARE
Pt clear for DC from case management standpoint. Discharging to home with daughter.            04/10/25 0916   Final Note   Assessment Type Final Discharge Note   Anticipated Discharge Disposition Home   Hospital Resources/Appts/Education Provided Appointments scheduled and added to AVS

## 2025-04-10 NOTE — NURSING
Pt discharged with daughter. IV and tele removed. Discharge instructions taught and pt understood. Brought down via w/c to daughters car.

## 2025-04-10 NOTE — NURSING
IV and tele removed. Orders reviewed. Prescriptions sent to pharmacy. Family here . Pt escorted to car via w/c . Pt d/c home   Inferior Lateral Orbicularis Oculi Units: 0

## 2025-04-10 NOTE — PLAN OF CARE
Problem: Adult Inpatient Plan of Care  Goal: Plan of Care Review  Outcome: Met  Goal: Patient-Specific Goal (Individualized)  Outcome: Met  Goal: Absence of Hospital-Acquired Illness or Injury  Outcome: Met  Goal: Optimal Comfort and Wellbeing  Outcome: Met  Goal: Readiness for Transition of Care  Outcome: Met     Problem: Wound  Goal: Optimal Coping  Outcome: Met  Goal: Optimal Functional Ability  Outcome: Met  Goal: Absence of Infection Signs and Symptoms  Outcome: Met  Goal: Improved Oral Intake  Outcome: Met  Goal: Optimal Pain Control and Function  Outcome: Met  Goal: Skin Health and Integrity  Outcome: Met  Goal: Optimal Wound Healing  Outcome: Met     Problem: Heart Failure  Goal: Optimal Coping  Outcome: Met  Goal: Optimal Cardiac Output  Outcome: Met  Goal: Stable Heart Rate and Rhythm  Outcome: Met  Goal: Optimal Functional Ability  Outcome: Met  Goal: Fluid and Electrolyte Balance  Outcome: Met  Goal: Improved Oral Intake  Outcome: Met  Goal: Effective Oxygenation and Ventilation  Outcome: Met  Goal: Effective Breathing Pattern During Sleep  Outcome: Met     Problem: Fall Injury Risk  Goal: Absence of Fall and Fall-Related Injury  Outcome: Met     Problem: Acute Kidney Injury/Impairment  Goal: Fluid and Electrolyte Balance  Outcome: Met  Goal: Improved Oral Intake  Outcome: Met  Goal: Effective Renal Function  Outcome: Met

## 2025-04-10 NOTE — DISCHARGE INSTRUCTIONS
You can take Lasix 20mg once daily as needed for leg swelling or for weight gain >3lb within 24 hours or >5lb within a week.   At home, restrict daily sodium intake <2g and fluid intake <2L.  Compression stockings while on your feet and elevate your legs for any swelling.  Check your blood pressure twice daily at home and keep a written log of readings to bring to your follow up appointment.  Follow up with your primary cardiologist outpatient.

## 2025-04-10 NOTE — DISCHARGE SUMMARY
"[facility]  Discharge Summary      Patient Name: Alayna Bergman  MRN: [id number]  KATEY: [id number]  Patient Class: IP- Inpatient  Admission Date: 4/6/2025  Hospital Length of Stay: 3 days  Discharge Date and Time: 04/10/2025 12:22 PM  Attending Physician: Franc Donohue MD   Discharging Provider: Linda Garcia NP  Primary Care Provider: Todd Noel MD    Primary Care Team: Networked reference to record PCT     HPI:   Alayna Bergman is a 74 year old female with a previous medical history of Hypertension, Hyperlipidemia, Supraventricular arrythmia 3rd degree heart block with pacemaker in place who presented to the ED for leg swelling. Patient reports increased leg swelling for about two weeks but significantly worse over one week. She denies any shortness of breath or increased pillow usage at night to sleep. Reports she sleeps propped up but could lay flat with no issue to sleep. Patient is followed by Dr. Collier in [facility] and last note from 10/10/24 in care everywhere. In this note it reports that she is on HCTZ but then it is not listed in her med list. Patient reports she does not take a daily diuretic and the only time she ever took a "fluid pill" was when they prescribed her three days of furosemide after visiting urgent care for leg swelling related to taking PO minoxidil. Initial ED labs showed a BNP of 1,434 and chest xray showed central pulmonary vascular congestion. CBC normal. CMP showed creatinine of 1.2 with GFR of 48. TSH normal. EKG shows a ventricular paced rhythm. Last Echo from 2020 showed EF of 62%. Patient does report some dietary indiscretion and increased salty foods over the last two weeks. Venous US of left lower extremity negative for DVT. Patient administered 40mg of lasix in ED and admitted by hospital medicine for further evaluation and management.     * No surgery found *      Hospital Course:   Alayna Bergman was closely monitored " while in the hospital.  Patient was admitted to Hospital Medicine for concern of new onset of congestive heart failure.  Patient was started on IV Lasix 40 mg daily.  Echocardiogram with normal systolic function, EF 55 - 60% with normal diastolic function, Mild tricuspid regurgitation, PAS 28 mmHg and IVC 3 mmHg.  BNP on admission was 1400.  LE US negative for DVT.  Cardiology was consulted; felt swelling likely more related to venous insufficiency.  Patient endorses she had recently had an increase in salt intake after attending multiple fish and crawfish boils.  Registered dietitian consulted for education on low-sodium diet.  Patient is prescribed hydrochlorothiazide 12.5 combination with lisinopril, that she states that she has not been taking.  Patient thoroughly educated at the bedside on the need to decrease salt intake and to take all medications as prescribed.  Patient developed SERGEY; diuretics held.  Patient's renal function improved.  She also had significant improvement in her lower extremity swelling.  Patient seen and examined on day of discharge and deemed stable for discharge home.  She is to follow up with PCP and Cardiology in 1 week.           Goals of Care Treatment Preferences:  Code Status: Full Code      SDOH Screening:  The patient was screened for utility difficulties, food insecurity, transport difficulties, housing insecurity, and interpersonal safety and there were no concerns identified this admission.     Consults:   Consults (From admission, onward)          Status Ordering Provider     Inpatient consult to Cardiology  Once        Provider:  Thanh Hansen MD Acknowledged FOUCHEA, ANGELA     Inpatient consult to Social Work/Case Management  Once        Provider:  (Not yet assigned)    Completed RUFUS MEDRANO     Inpatient consult to Registered Dietitian/Nutritionist  Once        Provider:  (Not yet assigned)    Completed RUFUS MEDRANO            Final Active Diagnoses:     Diagnosis Date Noted POA    PRINCIPAL PROBLEM:  Bilateral lower extremity edema [R60.0] 04/08/2025 Yes    SERGEY (acute kidney injury) [N17.9] 04/09/2025 Yes    Pacemaker [Z95.0] 04/07/2025 Yes    Hypertension [I10] 04/07/2025 Yes    Hyperlipidemia [E78.5] 04/07/2025 Yes      Problems Resolved During this Admission:    Diagnosis Date Noted Date Resolved POA    Bilateral lower extremity edema [R60.0] 04/09/2025 04/10/2025 Yes       Discharged Condition: stable    Disposition: Home or Self Care    Follow Up:   Follow-up Information       Todd Noel MD. Go on 4/17/2025.    Specialty: Family Medicine  Why: 10:30 AM for hospital follow up     (fax DC summary to [phone])  Contact information:  [address] [place] [address]  [phone]               Juan Pablo Mcbride MD Follow up.    Specialty: Cardiology  Why: requested apt. office should call you for scheduling. if you do not hear from office in next few days, please call to schedule apt  Contact information:  [address]  [address]  [place] [address]  [phone]                           Patient Instructions:      Diet Cardiac     Notify your health care provider if you experience any of the following:  temperature >100.4     Notify your health care provider if you experience any of the following:  persistent nausea and vomiting or diarrhea     Notify your health care provider if you experience any of the following:  severe uncontrolled pain     Notify your health care provider if you experience any of the following:  difficulty breathing or increased cough     Notify your health care provider if you experience any of the following:  severe persistent headache     Notify your health care provider if you experience any of the following:  persistent dizziness, light-headedness, or visual disturbances     Notify your health care provider if you experience any of the following:  increased confusion or weakness     Activity as tolerated        Significant Diagnostic Studies: Labs: CMP   Recent Labs   Lab 04/09/25  0343 04/10/25  0546    138   K 4.2 4.8   CO2 29 28   BUN 42* 39*   CREATININE 2.1* 1.6*   CALCIUM 9.5 9.4   , CBC   Recent Labs   Lab 04/09/25  0343 04/10/25  0521   WBC 5.62 3.56*   HGB 13.2 12.6   HCT 41.5 39.2    233   , and All labs within the past 24 hours have been reviewed    Pending Diagnostic Studies:       None           Medications:  Reconciled Home Medications:      Medication List        START taking these medications      furosemide 20 MG tablet  Commonly known as: LASIX  Take 1 tablet (20 mg total) by mouth daily as needed (leg swelling).            CONTINUE taking these medications      aspirin 81 MG Chew  Take 81 mg by mouth once daily.     atorvastatin 10 MG tablet  Commonly known as: LIPITOR  Take 10 mg by mouth once daily.     cetirizine 10 MG tablet  Commonly known as: ZYRTEC  Take 1 tablet by mouth every morning.     lisinopriL 20 MG tablet  Commonly known as: PRINIVIL,ZESTRIL  Take 20 mg by mouth once daily.     metoprolol tartrate 100 MG tablet  Commonly known as: LOPRESSOR  Take 100 mg by mouth 2 (two) times daily.     pseudoephedrine 30 MG tablet  Commonly known as: SUDAFED  Take 30 mg by mouth every 4 (four) hours as needed for Congestion.            STOP taking these medications      amLODIPine 10 MG tablet  Commonly known as: NORVASC              Indwelling Lines/Drains at time of discharge:   Lines/Drains/Airways       None                   Time spent on the discharge of patient: 35 minutes         Linda Garcia NP  Department of Hospital Medicine  [facility]/Surg

## 2025-04-10 NOTE — ASSESSMENT & PLAN NOTE
"Patient's blood pressure range in the last 24 hours was: BP  Min: 104/57  Max: 138/60.The patient's inpatient anti-hypertensive regimen is listed below:  Current Antihypertensives  metoprolol tartrate (LOPRESSOR) tablet 100 mg, 2 times daily, Oral    Plan  Currently normotensive  Hold home amlodipine as this may be contributing to lower extremity edema   Hold lisinopril for SERGEY    Patient has  unknown  heart failure that is Acute. On presentation their CHF was decompensated. Evidence of decompensated CHF on presentation includes: edema. The etiology of their decompensation is likely dietary indiscretion and medication non-compliance. Most recent BNP and echo results are listed below.  No results for input(s): "BNP" in the last 72 hours.    Latest ECHO  Results for orders placed during the hospital encounter of 04/06/25    Echo    Interpretation Summary    Left Ventricle: The left ventricle is normal in size. Normal wall thickness. Normal wall motion. There is normal systolic function with a visually estimated ejection fraction of 55 - 60%. There is normal diastolic function.    Right Ventricle: The right ventricle is normal in size. Wall thickness is normal. Systolic function is normal.    Tricuspid Valve: There is mild regurgitation.    Pulmonary Artery: The estimated pulmonary artery systolic pressure is 28 mmHg.    IVC/SVC: Normal venous pressure at 3 mmHg.    Current Heart Failure Medications       Plan  - Monitor strict I&Os and daily weights.    - Place on telemetry  - Low sodium diet  - Place on fluid restriction of 1.5 L.   - Cardiology has been consulted  - The patient's volume status is improving but not at their baseline as indicated by edema  - Hold diuresis 4/9 due to SERGEY    Cardiology consulted; no evidence of CHF but at risk for CHF.  Edema likely due to venous insufficiency   "

## 2025-04-10 NOTE — PLAN OF CARE
Problem: Adult Inpatient Plan of Care  Goal: Plan of Care Review  Outcome: Progressing  Goal: Patient-Specific Goal (Individualized)  Outcome: Progressing  Flowsheets (Taken 4/10/2025 9333)  Individualized Care Needs: Safety, fluid restriction, strict I&O, mobility  Anxieties, Fears or Concerns: None stated  Patient/Family-Specific Goals (Include Timeframe): Pt will remain compliant with fluid restriction by discharge  Goal: Absence of Hospital-Acquired Illness or Injury  Outcome: Progressing  Goal: Optimal Comfort and Wellbeing  Outcome: Progressing  Goal: Readiness for Transition of Care  Outcome: Progressing     Problem: Heart Failure  Goal: Optimal Cardiac Output  Outcome: Progressing  Goal: Stable Heart Rate and Rhythm  Outcome: Progressing     Problem: Fall Injury Risk  Goal: Absence of Fall and Fall-Related Injury  Outcome: Progressing     Problem: Acute Kidney Injury/Impairment  Goal: Fluid and Electrolyte Balance  Outcome: Progressing       Pt compliant on 2g sodium diet and 1.5L fluid restriction. Pt family at bedside overnight.  Pt bed locked in lowest position, call light within reach, side rails up x2, and room near nurses station.

## 2025-07-16 ENCOUNTER — HOSPITAL ENCOUNTER (EMERGENCY)
Facility: HOSPITAL | Age: 75
Discharge: HOME | End: 2025-07-16
Attending: EMERGENCY MEDICINE
Payer: MEDICARE

## 2025-07-16 VITALS
RESPIRATION RATE: 20 BRPM | WEIGHT: 165 LBS | SYSTOLIC BLOOD PRESSURE: 182 MMHG | OXYGEN SATURATION: 98 % | TEMPERATURE: 98 F | HEIGHT: 63 IN | BODY MASS INDEX: 29.23 KG/M2 | DIASTOLIC BLOOD PRESSURE: 78 MMHG | HEART RATE: 71 BPM

## 2025-07-16 DIAGNOSIS — S80.02XA CONTUSION OF LEFT KNEE: ICD-10-CM

## 2025-07-16 PROCEDURE — 73562 X-RAY EXAM OF KNEE 3: CPT | Mod: TC,LT

## 2025-07-16 PROCEDURE — 99283 EMERGENCY DEPT VISIT LOW MDM: CPT | Mod: 25

## 2025-07-16 PROCEDURE — 25000003 PHARM REV CODE 250: Performed by: EMERGENCY MEDICINE

## 2025-07-16 PROCEDURE — 73562 X-RAY EXAM OF KNEE 3: CPT | Mod: 26,LT,, | Performed by: RADIOLOGY

## 2025-07-16 RX ORDER — ACETAMINOPHEN 325 MG/1
650 TABLET ORAL
Status: COMPLETED | OUTPATIENT
Start: 2025-07-16 | End: 2025-07-16

## 2025-07-16 RX ORDER — IBUPROFEN 600 MG/1
600 TABLET, FILM COATED ORAL
Status: COMPLETED | OUTPATIENT
Start: 2025-07-16 | End: 2025-07-16

## 2025-07-16 RX ADMIN — IBUPROFEN 600 MG: 600 TABLET ORAL at 06:07

## 2025-07-16 RX ADMIN — ACETAMINOPHEN 650 MG: 325 TABLET ORAL at 06:07
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
## 2025-07-16 NOTE — DISCHARGE INSTRUCTIONS
Your x-ray was normal.  No fracture, no dislocation, etc..  Likely a simple bruise.  Take alternating doses of Tylenol and Motrin, and if symptoms continue, follow-up with Dr. Carlos, the orthopedist.  Return here as needed.
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
